# Patient Record
Sex: MALE | Race: WHITE | Employment: FULL TIME | URBAN - NONMETROPOLITAN AREA
[De-identification: names, ages, dates, MRNs, and addresses within clinical notes are randomized per-mention and may not be internally consistent; named-entity substitution may affect disease eponyms.]

---

## 2020-12-29 ENCOUNTER — HOSPITAL ENCOUNTER (OUTPATIENT)
Dept: GENERAL RADIOLOGY | Age: 60
Discharge: HOME | End: 2020-12-29
Attending: FAMILY MEDICINE
Payer: COMMERCIAL

## 2020-12-29 ENCOUNTER — OFFICE VISIT (OUTPATIENT)
Dept: FAMILY MEDICINE CLINIC | Facility: CLINIC | Age: 60
End: 2020-12-29
Payer: COMMERCIAL

## 2020-12-29 VITALS
OXYGEN SATURATION: 98 % | TEMPERATURE: 99 F | HEIGHT: 71.5 IN | SYSTOLIC BLOOD PRESSURE: 134 MMHG | BODY MASS INDEX: 27.39 KG/M2 | WEIGHT: 200 LBS | HEART RATE: 79 BPM | DIASTOLIC BLOOD PRESSURE: 82 MMHG

## 2020-12-29 DIAGNOSIS — Z23 NEED FOR VACCINATION: ICD-10-CM

## 2020-12-29 DIAGNOSIS — N39.43 BENIGN PROSTATIC HYPERPLASIA WITH POST-VOID DRIBBLING: ICD-10-CM

## 2020-12-29 DIAGNOSIS — Z00.00 LABORATORY EXAM ORDERED AS PART OF ROUTINE GENERAL MEDICAL EXAMINATION: ICD-10-CM

## 2020-12-29 DIAGNOSIS — R09.89 ABNORMAL LUNG SOUNDS: ICD-10-CM

## 2020-12-29 DIAGNOSIS — Z12.11 SCREEN FOR COLON CANCER: ICD-10-CM

## 2020-12-29 DIAGNOSIS — N40.1 BENIGN PROSTATIC HYPERPLASIA WITH POST-VOID DRIBBLING: ICD-10-CM

## 2020-12-29 DIAGNOSIS — Z00.00 PREVENTATIVE HEALTH CARE: Primary | ICD-10-CM

## 2020-12-29 DIAGNOSIS — Z13.220 SCREENING, LIPID: ICD-10-CM

## 2020-12-29 DIAGNOSIS — Z12.5 SCREENING FOR PROSTATE CANCER: ICD-10-CM

## 2020-12-29 PROCEDURE — 3075F SYST BP GE 130 - 139MM HG: CPT | Performed by: FAMILY MEDICINE

## 2020-12-29 PROCEDURE — 99386 PREV VISIT NEW AGE 40-64: CPT | Performed by: FAMILY MEDICINE

## 2020-12-29 PROCEDURE — 71046 X-RAY EXAM CHEST 2 VIEWS: CPT | Performed by: FAMILY MEDICINE

## 2020-12-29 PROCEDURE — 3008F BODY MASS INDEX DOCD: CPT | Performed by: FAMILY MEDICINE

## 2020-12-29 PROCEDURE — 3079F DIAST BP 80-89 MM HG: CPT | Performed by: FAMILY MEDICINE

## 2020-12-29 PROCEDURE — 90471 IMMUNIZATION ADMIN: CPT | Performed by: FAMILY MEDICINE

## 2020-12-29 PROCEDURE — 90715 TDAP VACCINE 7 YRS/> IM: CPT | Performed by: FAMILY MEDICINE

## 2020-12-29 RX ORDER — TAMSULOSIN HYDROCHLORIDE 0.4 MG/1
0.4 CAPSULE ORAL NIGHTLY
Qty: 30 CAPSULE | Refills: 1 | Status: SHIPPED | OUTPATIENT
Start: 2020-12-29 | End: 2021-02-02

## 2020-12-29 RX ORDER — DESONIDE 0.5 MG/G
CREAM TOPICAL
COMMUNITY

## 2020-12-29 RX ORDER — DUTASTERIDE 0.5 MG/1
0.5 CAPSULE, LIQUID FILLED ORAL DAILY
Qty: 30 CAPSULE | Refills: 2 | Status: SHIPPED | OUTPATIENT
Start: 2020-12-29 | End: 2021-02-02 | Stop reason: ALTCHOICE

## 2020-12-29 NOTE — PATIENT INSTRUCTIONS
1. Preventative health care  Age-appropriate preventative health screening exams as well as immunizations. Flu vaccine declined    2. Screen for colon cancer  Recurrent recommendations for colon cancer screening.   Patient given contact information for bot

## 2020-12-29 NOTE — H&P
Elizabeth Araiza is a 56year old male who presents for a complete physical exam.   HPI:   Pt voices concerns: x several months,slower urinary stream, dribbles at end of void    Wt Readings from Last 6 Encounters:  12/29/20 : 200 lb (90.7 kg)  12/07/12 : 202 last eye exam:1 yr @ [facility]  ENT: denies nasal congestion, PND or ST, denies snoring and reported periods of apnea, denies hearing deficits  LUNGS: denies shortness of breath with exertion, no coughing or wheezing  CARDIOVASCULAR: denies chest pain on e nerves are intact,motor and sensory are grossly intact, DTRs +2/4 UE/LE bilaterally  PSYCH: affect: slightly anxious, speech: clear and coherent, Insight: appropriate  ASSESSMENT AND PLAN:   Imani Boone is a 56year old male  [facility] health care  (p tamsulosin if a good response was achieved. 4. Need for vaccination  I reviewed age-appropriate medications. Tdap booster given.   Patient given vaccine information for Shingrix  - TETANUS, DIPHTHERIA TOXOIDS AND ACELLULAR PERTUSIS VACCINE (TDAP), >7 YE

## 2020-12-30 ENCOUNTER — LAB ENCOUNTER (OUTPATIENT)
Dept: LAB | Age: 60
End: 2020-12-30
Attending: FAMILY MEDICINE
Payer: COMMERCIAL

## 2020-12-30 DIAGNOSIS — Z13.220 SCREENING, LIPID: ICD-10-CM

## 2020-12-30 DIAGNOSIS — R97.20 ELEVATED PSA, GREATER THAN OR EQUAL TO 20 NG/ML: Primary | ICD-10-CM

## 2020-12-30 DIAGNOSIS — Z12.5 SCREENING FOR PROSTATE CANCER: ICD-10-CM

## 2020-12-30 DIAGNOSIS — Z00.00 LABORATORY EXAM ORDERED AS PART OF ROUTINE GENERAL MEDICAL EXAMINATION: ICD-10-CM

## 2020-12-30 LAB
ALBUMIN SERPL-MCNC: 3.8 G/DL (ref 3.4–5)
ALBUMIN/GLOB SERPL: 1.1 {RATIO} (ref 1–2)
ALP LIVER SERPL-CCNC: 64 U/L
ALT SERPL-CCNC: 43 U/L
ANION GAP SERPL CALC-SCNC: 4 MMOL/L (ref 0–18)
AST SERPL-CCNC: 21 U/L (ref 15–37)
BILIRUB SERPL-MCNC: 0.6 MG/DL (ref 0.1–2)
BUN BLD-MCNC: 21 MG/DL (ref 7–18)
BUN/CREAT SERPL: 19.8 (ref 10–20)
CALCIUM BLD-MCNC: 9.2 MG/DL (ref 8.5–10.1)
CHLORIDE SERPL-SCNC: 108 MMOL/L (ref 98–112)
CHOLEST SMN-MCNC: 248 MG/DL (ref ?–200)
CO2 SERPL-SCNC: 26 MMOL/L (ref 21–32)
COMPLEXED PSA SERPL-MCNC: 113 NG/ML (ref ?–4)
CREAT BLD-MCNC: 1.06 MG/DL
GLOBULIN PLAS-MCNC: 3.4 G/DL (ref 2.8–4.4)
GLUCOSE BLD-MCNC: 100 MG/DL (ref 70–99)
HDLC SERPL-MCNC: 51 MG/DL (ref 40–59)
LDLC SERPL CALC-MCNC: 179 MG/DL (ref ?–100)
M PROTEIN MFR SERPL ELPH: 7.2 G/DL (ref 6.4–8.2)
NONHDLC SERPL-MCNC: 197 MG/DL (ref ?–130)
OSMOLALITY SERPL CALC.SUM OF ELEC: 289 MOSM/KG (ref 275–295)
PATIENT FASTING Y/N/NP: YES
PATIENT FASTING Y/N/NP: YES
POTASSIUM SERPL-SCNC: 4.2 MMOL/L (ref 3.5–5.1)
SODIUM SERPL-SCNC: 138 MMOL/L (ref 136–145)
TRIGL SERPL-MCNC: 89 MG/DL (ref 30–149)
VLDLC SERPL CALC-MCNC: 18 MG/DL (ref 0–30)

## 2020-12-30 PROCEDURE — 80061 LIPID PANEL: CPT

## 2020-12-30 PROCEDURE — 36415 COLL VENOUS BLD VENIPUNCTURE: CPT

## 2020-12-30 PROCEDURE — 80053 COMPREHEN METABOLIC PANEL: CPT

## 2021-01-01 ENCOUNTER — TELEPHONE (OUTPATIENT)
Dept: SURGERY | Facility: HOSPITAL | Age: 61
End: 2021-01-01

## 2021-01-01 DIAGNOSIS — N13.8 BPH WITH OBSTRUCTION/LOWER URINARY TRACT SYMPTOMS: Primary | ICD-10-CM

## 2021-01-01 DIAGNOSIS — N40.1 BPH WITH OBSTRUCTION/LOWER URINARY TRACT SYMPTOMS: Primary | ICD-10-CM

## 2021-01-02 DIAGNOSIS — E78.00 ELEVATED CHOLESTEROL: ICD-10-CM

## 2021-01-02 DIAGNOSIS — Z00.00 PREVENTATIVE HEALTH CARE: Primary | ICD-10-CM

## 2021-01-02 DIAGNOSIS — Z13.220 SCREENING, LIPID: ICD-10-CM

## 2021-01-04 DIAGNOSIS — E78.00 ELEVATED CHOLESTEROL: ICD-10-CM

## 2021-01-04 DIAGNOSIS — Z13.220 SCREENING, LIPID: ICD-10-CM

## 2021-01-04 DIAGNOSIS — Z00.00 PREVENTATIVE HEALTH CARE: ICD-10-CM

## 2021-01-04 PROBLEM — N40.1 BPH WITH OBSTRUCTION/LOWER URINARY TRACT SYMPTOMS: Status: ACTIVE | Noted: 2021-01-04

## 2021-01-04 PROBLEM — N13.8 BPH WITH OBSTRUCTION/LOWER URINARY TRACT SYMPTOMS: Status: ACTIVE | Noted: 2021-01-04

## 2021-01-04 PROBLEM — R97.20 ELEVATED PSA: Status: ACTIVE | Noted: 2021-01-04

## 2021-01-22 PROBLEM — N13.30 HYDRONEPHROSIS OF RIGHT KIDNEY: Status: ACTIVE | Noted: 2021-01-22

## 2021-01-22 PROCEDURE — 88305 TISSUE EXAM BY PATHOLOGIST: CPT | Performed by: UROLOGY

## 2021-01-30 ENCOUNTER — NURSE ONLY (OUTPATIENT)
Dept: FAMILY MEDICINE CLINIC | Facility: CLINIC | Age: 61
End: 2021-01-30
Payer: COMMERCIAL

## 2021-01-30 DIAGNOSIS — N40.1 BPH WITH OBSTRUCTION/LOWER URINARY TRACT SYMPTOMS: ICD-10-CM

## 2021-01-30 DIAGNOSIS — R97.20 ELEVATED PSA: ICD-10-CM

## 2021-01-30 DIAGNOSIS — N13.30 HYDRONEPHROSIS OF RIGHT KIDNEY: ICD-10-CM

## 2021-01-30 DIAGNOSIS — N13.8 BPH WITH OBSTRUCTION/LOWER URINARY TRACT SYMPTOMS: ICD-10-CM

## 2021-01-30 LAB
BUN BLD-MCNC: 14 MG/DL (ref 7–18)
CREAT BLD-MCNC: 1.09 MG/DL
PSA SERPL-MCNC: 63.2 NG/ML (ref ?–4)

## 2021-01-30 PROCEDURE — 84153 ASSAY OF PSA TOTAL: CPT | Performed by: UROLOGY

## 2021-01-30 PROCEDURE — 82565 ASSAY OF CREATININE: CPT | Performed by: UROLOGY

## 2021-01-30 PROCEDURE — 84520 ASSAY OF UREA NITROGEN: CPT | Performed by: UROLOGY

## 2021-02-01 NOTE — PROGRESS NOTES
Noted.   Positive prostate biopsy 1/22/21 with Dr. Gibson Modest scan and ct urogram pending  Patient to f/u with Dr. Erick Oro.     Future Appointments  2/2/2021   4:00 PM    Hetal UNGER MD           [facility] URO    [facility]  2/5/2021   12:00 PM   DUSTIN ORTEGA IN

## 2021-04-06 ENCOUNTER — OFFICE VISIT (OUTPATIENT)
Dept: FAMILY MEDICINE CLINIC | Facility: CLINIC | Age: 61
End: 2021-04-06
Payer: COMMERCIAL

## 2021-04-06 VITALS
BODY MASS INDEX: 23.16 KG/M2 | RESPIRATION RATE: 14 BRPM | OXYGEN SATURATION: 99 % | HEART RATE: 72 BPM | WEIGHT: 171 LBS | DIASTOLIC BLOOD PRESSURE: 80 MMHG | HEIGHT: 72 IN | SYSTOLIC BLOOD PRESSURE: 110 MMHG | TEMPERATURE: 98 F

## 2021-04-06 DIAGNOSIS — C61 PROSTATE CANCER (HCC): ICD-10-CM

## 2021-04-06 DIAGNOSIS — N40.1 BPH WITH OBSTRUCTION/LOWER URINARY TRACT SYMPTOMS: ICD-10-CM

## 2021-04-06 DIAGNOSIS — Z12.11 SCREEN FOR COLON CANCER: ICD-10-CM

## 2021-04-06 DIAGNOSIS — N13.8 BPH WITH OBSTRUCTION/LOWER URINARY TRACT SYMPTOMS: ICD-10-CM

## 2021-04-06 DIAGNOSIS — Z13.0 SCREENING, ANEMIA, DEFICIENCY, IRON: ICD-10-CM

## 2021-04-06 DIAGNOSIS — E78.00 HYPERCHOLESTEROLEMIA: ICD-10-CM

## 2021-04-06 DIAGNOSIS — C61 PROSTATE CANCER (HCC): Primary | ICD-10-CM

## 2021-04-06 PROCEDURE — 99214 OFFICE O/P EST MOD 30 MIN: CPT | Performed by: FAMILY MEDICINE

## 2021-04-06 PROCEDURE — 3074F SYST BP LT 130 MM HG: CPT | Performed by: FAMILY MEDICINE

## 2021-04-06 PROCEDURE — 3079F DIAST BP 80-89 MM HG: CPT | Performed by: FAMILY MEDICINE

## 2021-04-06 PROCEDURE — 3008F BODY MASS INDEX DOCD: CPT | Performed by: FAMILY MEDICINE

## 2021-04-06 RX ORDER — BICALUTAMIDE 50 MG/1
TABLET, FILM COATED ORAL
COMMUNITY
Start: 2021-04-03 | End: 2021-07-15 | Stop reason: ALTCHOICE

## 2021-04-06 RX ORDER — TAMSULOSIN HYDROCHLORIDE 0.4 MG/1
0.4 CAPSULE ORAL NIGHTLY
Qty: 30 CAPSULE | Refills: 1 | Status: SHIPPED | OUTPATIENT
Start: 2021-04-06 | End: 2021-05-06

## 2021-04-06 NOTE — PROGRESS NOTES
Balaji Shelby. is a 56year old male. Patient presents with: Follow - Up  Prostate Cancer  Lipids    HPI:   Since last visit, patient was diagnosed with prostate cancer. He is undergoing hormonal and radiation therapy.   He has not started radiation the lesions or rashes  ENT: denies nasal congestion, pnd, sore throat, ear pain or pressure, decreased hearing  RESPIRATORY: denies shortness of breath with exertion,cough, wheezing  CARDIOVASCULAR: denies chest pain on exertion, edema  GI: denies abdominal pa coverage for Shingrix vaccine as well as a Prevnar. As he is getting his second Covid shot tonight, I would recommend delaying additional vaccines for the next 4 weeks 9 total 25 minutes were spent with the patient answering all of his questions.   I would

## 2021-04-06 NOTE — PATIENT INSTRUCTIONS
1. Prostate cancer (HCC)-1/22/21  I reviewed urology and radiation therapy recommendations. I agree with a second opinion. I also reviewed age-appropriate medications.   I have asked the patient to check with his insurance regarding coverage for Shingrix

## 2021-04-20 ENCOUNTER — LAB ENCOUNTER (OUTPATIENT)
Dept: LAB | Facility: HOSPITAL | Age: 61
End: 2021-04-20
Attending: STUDENT IN AN ORGANIZED HEALTH CARE EDUCATION/TRAINING PROGRAM
Payer: COMMERCIAL

## 2021-04-20 DIAGNOSIS — Z01.818 PRE-OP TESTING: ICD-10-CM

## 2021-04-23 PROBLEM — Z12.11 SPECIAL SCREENING FOR MALIGNANT NEOPLASMS, COLON: Status: ACTIVE | Noted: 2021-04-23

## 2021-05-27 RX ORDER — TAMSULOSIN HYDROCHLORIDE 0.4 MG/1
CAPSULE ORAL
Qty: 30 CAPSULE | Refills: 0 | Status: SHIPPED | OUTPATIENT
Start: 2021-05-27 | End: 2021-06-22

## 2021-05-27 NOTE — TELEPHONE ENCOUNTER
Last refill #30 x 1 on 4/6/2021  Last office visit pertaining to refill on 4/6/2021  No future appointments.

## 2021-06-22 RX ORDER — TAMSULOSIN HYDROCHLORIDE 0.4 MG/1
0.4 CAPSULE ORAL NIGHTLY
Qty: 90 CAPSULE | Refills: 1 | Status: SHIPPED | OUTPATIENT
Start: 2021-06-22 | End: 2021-12-20

## 2021-06-22 NOTE — TELEPHONE ENCOUNTER
Last refill; 05/27/21  Qty: 30  W/ 0 refills  Last ov: 04/06/21    Requested Prescriptions     Pending Prescriptions Disp Refills   • tamsulosin (FLOMAX) cap [Pharmacy Med Name: TAMSULOSIN HCL 0.4 MG CAPSULE] 30 capsule 0     Sig: TAKE 1 CAPSULE BY MOUTH E

## 2021-06-30 ENCOUNTER — MED REC SCAN ONLY (OUTPATIENT)
Dept: FAMILY MEDICINE CLINIC | Facility: CLINIC | Age: 61
End: 2021-06-30

## 2021-07-13 ENCOUNTER — TELEPHONE (OUTPATIENT)
Dept: FAMILY MEDICINE CLINIC | Facility: CLINIC | Age: 61
End: 2021-07-13

## 2021-07-13 NOTE — TELEPHONE ENCOUNTER
PT. COMING IN ON Thursday FOR F/U FROM ER FOR BAD CASE OF HIVES. HE WANTS TO GET HIS FASTING LABS DONE BUT HE HAS TO EAT A VERY SMALL BREAKFAST BECAUSE OF MEDICATIONS HE MUST TAKE AT A CERTAIN TIME EVERY DAY AND MUST TAKE WITH FOOD.  HE WOULD JUST LIKE TO D

## 2021-07-13 NOTE — TELEPHONE ENCOUNTER
Pt advised of below. States he is on new meds for prostate cancer---he will bring list to update chart when here for OV.

## 2021-07-15 ENCOUNTER — OFFICE VISIT (OUTPATIENT)
Dept: FAMILY MEDICINE CLINIC | Facility: CLINIC | Age: 61
End: 2021-07-15
Payer: COMMERCIAL

## 2021-07-15 ENCOUNTER — LAB ENCOUNTER (OUTPATIENT)
Dept: LAB | Age: 61
End: 2021-07-15
Attending: FAMILY MEDICINE
Payer: COMMERCIAL

## 2021-07-15 VITALS
SYSTOLIC BLOOD PRESSURE: 110 MMHG | HEART RATE: 66 BPM | OXYGEN SATURATION: 100 % | BODY MASS INDEX: 24 KG/M2 | DIASTOLIC BLOOD PRESSURE: 78 MMHG | WEIGHT: 169 LBS | RESPIRATION RATE: 12 BRPM | TEMPERATURE: 98 F

## 2021-07-15 DIAGNOSIS — E78.00 HYPERCHOLESTEROLEMIA: ICD-10-CM

## 2021-07-15 DIAGNOSIS — F19.982 DRUG-INDUCED INSOMNIA (HCC): ICD-10-CM

## 2021-07-15 DIAGNOSIS — Z13.0 SCREENING, ANEMIA, DEFICIENCY, IRON: ICD-10-CM

## 2021-07-15 DIAGNOSIS — Z79.52 CURRENT CHRONIC USE OF SYSTEMIC STEROIDS: ICD-10-CM

## 2021-07-15 DIAGNOSIS — L23.7 ALLERGIC CONTACT DERMATITIS DUE TO PLANTS, EXCEPT FOOD: Primary | ICD-10-CM

## 2021-07-15 DIAGNOSIS — C61 PROSTATE CANCER (HCC): ICD-10-CM

## 2021-07-15 PROBLEM — Z80.41 FAMILY HISTORY OF OVARIAN CANCER: Status: ACTIVE | Noted: 2021-05-04

## 2021-07-15 PROBLEM — E83.110 HEREDITARY HEMOCHROMATOSIS: Status: ACTIVE | Noted: 2021-05-27

## 2021-07-15 PROBLEM — Z13.79 GENETIC TESTING: Status: ACTIVE | Noted: 2021-04-23

## 2021-07-15 PROBLEM — Z80.3 FAMILY HISTORY OF BREAST CANCER: Status: ACTIVE | Noted: 2021-05-04

## 2021-07-15 PROBLEM — Z79.818 ANDROGEN DEPRIVATION THERAPY: Status: ACTIVE | Noted: 2021-04-21

## 2021-07-15 PROBLEM — E83.110 HEREDITARY HEMOCHROMATOSIS (HCC): Status: ACTIVE | Noted: 2021-05-27

## 2021-07-15 PROBLEM — IMO0001 ANDROGEN DEPRIVATION THERAPY: Status: ACTIVE | Noted: 2021-04-21

## 2021-07-15 PROBLEM — Z80.0 FAMILY HISTORY OF PANCREATIC CANCER: Status: ACTIVE | Noted: 2021-05-04

## 2021-07-15 LAB
BASOPHILS # BLD AUTO: 0.01 X10(3) UL (ref 0–0.2)
BASOPHILS NFR BLD AUTO: 0.3 %
CHOLEST SMN-MCNC: 182 MG/DL (ref ?–200)
DEPRECATED RDW RBC AUTO: 43.4 FL (ref 35.1–46.3)
EOSINOPHIL # BLD AUTO: 0.01 X10(3) UL (ref 0–0.7)
EOSINOPHIL NFR BLD AUTO: 0.3 %
ERYTHROCYTE [DISTWIDTH] IN BLOOD BY AUTOMATED COUNT: 12.5 % (ref 11–15)
HCT VFR BLD AUTO: 39.4 %
HDLC SERPL-MCNC: 64 MG/DL (ref 40–59)
HGB BLD-MCNC: 14.4 G/DL
IMM GRANULOCYTES # BLD AUTO: 0.01 X10(3) UL (ref 0–1)
IMM GRANULOCYTES NFR BLD: 0.3 %
LDLC SERPL CALC-MCNC: 101 MG/DL (ref ?–100)
LYMPHOCYTES # BLD AUTO: 0.56 X10(3) UL (ref 1–4)
LYMPHOCYTES NFR BLD AUTO: 18.9 %
MCH RBC QN AUTO: 35.6 PG (ref 26–34)
MCHC RBC AUTO-ENTMCNC: 36.5 G/DL (ref 31–37)
MCV RBC AUTO: 97.3 FL
MONOCYTES # BLD AUTO: 0.32 X10(3) UL (ref 0.1–1)
MONOCYTES NFR BLD AUTO: 10.8 %
NEUTROPHILS # BLD AUTO: 2.06 X10 (3) UL (ref 1.5–7.7)
NEUTROPHILS # BLD AUTO: 2.06 X10(3) UL (ref 1.5–7.7)
NEUTROPHILS NFR BLD AUTO: 69.4 %
NONHDLC SERPL-MCNC: 118 MG/DL (ref ?–130)
PATIENT FASTING Y/N/NP: YES
PLATELET # BLD AUTO: 90 10(3)UL (ref 150–450)
PSA SERPL-MCNC: 0.06 NG/ML (ref ?–4)
RBC # BLD AUTO: 4.05 X10(6)UL
TRIGL SERPL-MCNC: 97 MG/DL (ref 30–149)
VLDLC SERPL CALC-MCNC: 16 MG/DL (ref 0–30)
WBC # BLD AUTO: 3 X10(3) UL (ref 4–11)

## 2021-07-15 PROCEDURE — 3074F SYST BP LT 130 MM HG: CPT | Performed by: FAMILY MEDICINE

## 2021-07-15 PROCEDURE — 85025 COMPLETE CBC W/AUTO DIFF WBC: CPT

## 2021-07-15 PROCEDURE — 99213 OFFICE O/P EST LOW 20 MIN: CPT | Performed by: FAMILY MEDICINE

## 2021-07-15 PROCEDURE — 3078F DIAST BP <80 MM HG: CPT | Performed by: FAMILY MEDICINE

## 2021-07-15 RX ORDER — EPINEPHRINE 0.3 MG/.3ML
INJECTION SUBCUTANEOUS
COMMUNITY
Start: 2021-07-12

## 2021-07-15 RX ORDER — PREDNISONE 20 MG/1
60 TABLET ORAL DAILY
COMMUNITY
Start: 2021-07-11 | End: 2021-07-15 | Stop reason: ALTCHOICE

## 2021-07-15 RX ORDER — PREDNISONE 1 MG/1
5 TABLET ORAL DAILY
COMMUNITY
Start: 2021-06-23

## 2021-07-15 RX ORDER — ABIRATERONE ACETATE 250 MG/1
250 TABLET ORAL DAILY
COMMUNITY
Start: 2021-04-26

## 2021-07-15 NOTE — PROGRESS NOTES
Chandler Grant. is a 59year old male. Patient presents with:  ER F[facility]. Lab: Would like to discuss lab results.     Pt wants labs done  HPI:   Patient seen evaluated emergency room on 7/11 after developing diffu denies fatigue, appetite ok, wt down 31# since Dec intentionally, eating healthier, no recent exercise since starting radiation  SKIN: See HPI, patient provides photos of diffuse blanchable erythematous rash on trunk upper and lower extremities and face th sedated in the morning  Patient congratulated on making dietary changes to affect weight loss  We will check fasting lipids today  The patient indicates understanding of these issues and agrees to the plan. Corinne Martinez.  Refugio Amador, FAAFP

## 2021-07-15 NOTE — PATIENT INSTRUCTIONS
I reviewed hospital records. Discussed irritant avoidance strategies  Recommend washing off any plant oils with dish soap ASAP after exposure  Discussed management strategies for insomnia.   Would recommend use of melatonin up to 10 mg nightly as needed, p

## 2021-07-22 ENCOUNTER — OFFICE VISIT (OUTPATIENT)
Dept: FAMILY MEDICINE CLINIC | Facility: CLINIC | Age: 61
End: 2021-07-22
Payer: COMMERCIAL

## 2021-07-22 VITALS
HEART RATE: 76 BPM | TEMPERATURE: 98 F | DIASTOLIC BLOOD PRESSURE: 70 MMHG | SYSTOLIC BLOOD PRESSURE: 110 MMHG | RESPIRATION RATE: 16 BRPM | BODY MASS INDEX: 23 KG/M2 | WEIGHT: 167.81 LBS

## 2021-07-22 DIAGNOSIS — T14.8XXA PUNCTURE WOUND: ICD-10-CM

## 2021-07-22 DIAGNOSIS — M70.41 PREPATELLAR BURSITIS OF RIGHT KNEE: Primary | ICD-10-CM

## 2021-07-22 PROCEDURE — 3074F SYST BP LT 130 MM HG: CPT | Performed by: FAMILY MEDICINE

## 2021-07-22 PROCEDURE — 99213 OFFICE O/P EST LOW 20 MIN: CPT | Performed by: FAMILY MEDICINE

## 2021-07-22 PROCEDURE — 3078F DIAST BP <80 MM HG: CPT | Performed by: FAMILY MEDICINE

## 2021-07-22 RX ORDER — CEPHALEXIN 500 MG/1
500 CAPSULE ORAL 3 TIMES DAILY
Qty: 21 CAPSULE | Refills: 0 | Status: SHIPPED | OUTPATIENT
Start: 2021-07-22 | End: 2021-07-29

## 2021-07-22 NOTE — PATIENT INSTRUCTIONS
Understanding Prepatellar Bursitis     A bursa is a thin, slippery, sac-like film. It contains a small amount of fluid. This structure is found between bones and soft tissues in and around joints. A bursa cushions and protects a joint.  It keeps parts of include exercises, ultrasound, or other treatments. · Kneepads. These help protect your knees during sports. · Injection of medicine into the bursa or drainage of fluid from the bursa. These may help relieve symptoms.  The medicine is usually a corticoste

## 2021-09-21 ENCOUNTER — MED REC SCAN ONLY (OUTPATIENT)
Dept: FAMILY MEDICINE CLINIC | Facility: CLINIC | Age: 61
End: 2021-09-21

## 2021-10-03 ENCOUNTER — HOSPITAL ENCOUNTER (OUTPATIENT)
Age: 61
Discharge: HOME | End: 2021-10-03
Payer: COMMERCIAL

## 2021-10-03 VITALS
OXYGEN SATURATION: 100 % | SYSTOLIC BLOOD PRESSURE: 123 MMHG | RESPIRATION RATE: 16 BRPM | DIASTOLIC BLOOD PRESSURE: 80 MMHG | HEART RATE: 70 BPM | TEMPERATURE: 98 F

## 2021-10-03 DIAGNOSIS — B02.9 HERPES ZOSTER WITHOUT COMPLICATION: Primary | ICD-10-CM

## 2021-10-03 PROCEDURE — 99203 OFFICE O/P NEW LOW 30 MIN: CPT | Performed by: PHYSICIAN ASSISTANT

## 2021-10-03 RX ORDER — PHENOL 1.4 %
600 AEROSOL, SPRAY (ML) MUCOUS MEMBRANE
COMMUNITY

## 2021-10-03 RX ORDER — VALACYCLOVIR HYDROCHLORIDE 1 G/1
1 TABLET, FILM COATED ORAL 3 TIMES DAILY
Qty: 21 TABLET | Refills: 0 | Status: SHIPPED | OUTPATIENT
Start: 2021-10-03 | End: 2021-10-10

## 2021-10-03 RX ORDER — HYDROCODONE BITARTRATE AND ACETAMINOPHEN 5; 325 MG/1; MG/1
1 TABLET ORAL EVERY 6 HOURS PRN
Qty: 10 TABLET | Refills: 0 | Status: SHIPPED | OUTPATIENT
Start: 2021-10-03 | End: 2021-10-10

## 2021-10-03 RX ORDER — CHOLECALCIFEROL (VITAMIN D3) 50 MCG
TABLET ORAL
COMMUNITY

## 2021-10-03 NOTE — ED INITIAL ASSESSMENT (HPI)
Pt sts left mid/upper abd tender/sensitive to the touch. This morning noted rash to the area. Denies fever.

## 2021-10-03 NOTE — ED PROVIDER NOTES
Patient Seen in: [facility] [address] [place] [address]      History   Patient presents with:  Rash Skin Problem    Stated Complaint: Rash x 1 day    Subjective:   HPI    22-year-old male presents to the IC for evaluation of rash to abdomen for 1 day.   Patient states he Refill: Capillary refill takes less than 2 seconds. Findings: Rash (3 vesicles to left upper abd) present. Neurological:      Mental Status: He is alert.              ED Course   Labs Reviewed - No data to display                MDM      59year-old

## 2021-12-10 ENCOUNTER — OFFICE VISIT (OUTPATIENT)
Dept: FAMILY MEDICINE CLINIC | Facility: CLINIC | Age: 61
End: 2021-12-10
Payer: COMMERCIAL

## 2021-12-10 ENCOUNTER — TELEPHONE (OUTPATIENT)
Dept: FAMILY MEDICINE CLINIC | Facility: CLINIC | Age: 61
End: 2021-12-10

## 2021-12-10 VITALS
HEIGHT: 72 IN | SYSTOLIC BLOOD PRESSURE: 120 MMHG | TEMPERATURE: 98 F | DIASTOLIC BLOOD PRESSURE: 83 MMHG | OXYGEN SATURATION: 98 % | WEIGHT: 175 LBS | BODY MASS INDEX: 23.7 KG/M2

## 2021-12-10 DIAGNOSIS — R09.89 RUNNY NOSE: Primary | ICD-10-CM

## 2021-12-10 DIAGNOSIS — Z20.822 EXPOSURE TO COVID-19 VIRUS: ICD-10-CM

## 2021-12-10 PROCEDURE — 3079F DIAST BP 80-89 MM HG: CPT | Performed by: PHYSICIAN ASSISTANT

## 2021-12-10 PROCEDURE — 99213 OFFICE O/P EST LOW 20 MIN: CPT | Performed by: PHYSICIAN ASSISTANT

## 2021-12-10 PROCEDURE — 3008F BODY MASS INDEX DOCD: CPT | Performed by: PHYSICIAN ASSISTANT

## 2021-12-10 PROCEDURE — U0002 COVID-19 LAB TEST NON-CDC: HCPCS | Performed by: PHYSICIAN ASSISTANT

## 2021-12-10 PROCEDURE — 3074F SYST BP LT 130 MM HG: CPT | Performed by: PHYSICIAN ASSISTANT

## 2021-12-10 NOTE — TELEPHONE ENCOUNTER
PT. HAVING SOME SYMPTOMS AND HE IS ASKING ABOUT THE OTC COVID TEST FROM [facility]. IS IT RELIABLE? 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

## 2021-12-10 NOTE — TELEPHONE ENCOUNTER
Spoke with patient who states he has 3 coworkers who are positive for Alondra. Patient states he has started with a bronchial intermittent cough and a slight runny nose. Denies any further symptoms.  He states he is going to try a home test. Advised he can do

## 2021-12-10 NOTE — PROGRESS NOTES
CHIEF COMPLAINT:   Patient presents with:  Runny Nose    HPI:   Jo Pruitt is a 59year old male who presents to [facility] for COVID-19 testing with symptoms/history as described below:  Onset: 1.5 weeks  Exposure to COVID:   Exposure to covid at work [employer] INJECT 0.3 MLS INTO THE MUSCLE ONCE FOR 1 DOSE. • Leuprolide Acetate, 6 Month, 45 MG Subcutaneous Kit Inject 45 mg into the skin every 6 (six) months.         Past Medical History:   Diagnosis Date   • Esophageal reflux    • Other and unspecified hyperl Collection Time: 12/10/21  5:40 PM    Specimen: Nares   Result Value Ref Range    Rapid SARS-CoV-2 by PCR Not Detected Not Detected    POCT Lot Number [id number]     POCT Expiration Date 6/8/22     POCT Procedure Control Control Valid Control Valid    Op

## 2021-12-20 RX ORDER — TAMSULOSIN HYDROCHLORIDE 0.4 MG/1
0.4 CAPSULE ORAL NIGHTLY
Qty: 90 CAPSULE | Refills: 3 | Status: SHIPPED | OUTPATIENT
Start: 2021-12-20

## 2022-05-17 ENCOUNTER — TELEPHONE (OUTPATIENT)
Dept: FAMILY MEDICINE CLINIC | Facility: CLINIC | Age: 62
End: 2022-05-17

## 2022-05-17 NOTE — TELEPHONE ENCOUNTER
L/m for pt to c/b to get more info. Last OV here was on 7/22/21  No future appts scheduled    Who is oncologist?  How often does he get these done?     Will need to check with Dr. Char Siemens and nurse manager

## 2022-05-17 NOTE — TELEPHONE ENCOUNTER
Angela Fu is calling he said that he gets Vit B 12 shots with his Oncologist and he is wondering if that would be something that he would be able to get done here please call

## 2023-10-12 ENCOUNTER — OFFICE VISIT (OUTPATIENT)
Dept: FAMILY MEDICINE CLINIC | Facility: CLINIC | Age: 63
End: 2023-10-12
Payer: MEDICARE

## 2023-10-12 VITALS
TEMPERATURE: 98 F | HEART RATE: 82 BPM | DIASTOLIC BLOOD PRESSURE: 80 MMHG | SYSTOLIC BLOOD PRESSURE: 120 MMHG | WEIGHT: 203.25 LBS | OXYGEN SATURATION: 99 % | HEIGHT: 71.75 IN | BODY MASS INDEX: 27.83 KG/M2

## 2023-10-12 DIAGNOSIS — T50.905A DRUG-INDUCED GYNECOMASTIA: ICD-10-CM

## 2023-10-12 DIAGNOSIS — Z00.00 ENCOUNTER FOR ANNUAL HEALTH EXAMINATION: Primary | ICD-10-CM

## 2023-10-12 DIAGNOSIS — Z11.59 NEED FOR HEPATITIS C SCREENING TEST: ICD-10-CM

## 2023-10-12 DIAGNOSIS — M54.50 CHRONIC MIDLINE LOW BACK PAIN WITHOUT SCIATICA: ICD-10-CM

## 2023-10-12 DIAGNOSIS — D69.6 THROMBOCYTOPENIA (HCC): ICD-10-CM

## 2023-10-12 DIAGNOSIS — D12.6 TUBULAR ADENOMA OF COLON: ICD-10-CM

## 2023-10-12 DIAGNOSIS — G89.29 CHRONIC MIDLINE LOW BACK PAIN WITHOUT SCIATICA: ICD-10-CM

## 2023-10-12 DIAGNOSIS — L40.9 PSORIASIS: ICD-10-CM

## 2023-10-12 DIAGNOSIS — E83.110 HEREDITARY HEMOCHROMATOSIS (HCC): ICD-10-CM

## 2023-10-12 DIAGNOSIS — C61 MALIGNANT NEOPLASM OF PROSTATE (HCC): ICD-10-CM

## 2023-10-12 DIAGNOSIS — R74.8 ELEVATED LIVER ENZYMES: ICD-10-CM

## 2023-10-12 DIAGNOSIS — Z13.1 SCREENING FOR DIABETES MELLITUS: ICD-10-CM

## 2023-10-12 DIAGNOSIS — K76.0 HEPATIC STEATOSIS: ICD-10-CM

## 2023-10-12 DIAGNOSIS — E83.10 DISORDER OF IRON METABOLISM, UNSPECIFIED: ICD-10-CM

## 2023-10-12 DIAGNOSIS — N62 DRUG-INDUCED GYNECOMASTIA: ICD-10-CM

## 2023-10-12 DIAGNOSIS — Z13.220 SCREENING, LIPID: ICD-10-CM

## 2023-10-12 DIAGNOSIS — Z23 NEED FOR VACCINATION: ICD-10-CM

## 2023-10-12 DIAGNOSIS — Z80.0 FAMILY HISTORY OF PANCREATIC CANCER: ICD-10-CM

## 2023-10-12 DIAGNOSIS — Z80.41 FAMILY HISTORY OF OVARIAN CANCER: ICD-10-CM

## 2023-10-12 LAB — PSA: 0.24

## 2023-10-12 PROCEDURE — 90677 PCV20 VACCINE IM: CPT | Performed by: FAMILY MEDICINE

## 2023-10-12 PROCEDURE — G0402 INITIAL PREVENTIVE EXAM: HCPCS | Performed by: FAMILY MEDICINE

## 2023-10-12 PROCEDURE — G0008 ADMIN INFLUENZA VIRUS VAC: HCPCS | Performed by: FAMILY MEDICINE

## 2023-10-12 PROCEDURE — G0009 ADMIN PNEUMOCOCCAL VACCINE: HCPCS | Performed by: FAMILY MEDICINE

## 2023-10-12 PROCEDURE — 90662 IIV NO PRSV INCREASED AG IM: CPT | Performed by: FAMILY MEDICINE

## 2023-10-12 PROCEDURE — 99214 OFFICE O/P EST MOD 30 MIN: CPT | Performed by: FAMILY MEDICINE

## 2023-10-12 NOTE — PATIENT INSTRUCTIONS
Lino Gould Jr.'s SCREENING SCHEDULE   Tests on this list are recommended by your physician but may not be covered, or covered at this frequency, by your insurer. Please check with your insurance carrier before scheduling to verify coverage.    PREVENTATIVE SERVICES FREQUENCY &  COVERAGE DETAILS LAST COMPLETION DATE   Diabetes Screening    Fasting Blood Sugar / Glucose    One screening every 12 months if never tested or if previously tested but not diagnosed with pre-diabetes   One screening every 6 months if diagnosed with pre-diabetes Lab Results   Component Value Date     (H) 12/30/2020        Cardiovascular Disease Screening    Lipid Panel  Cholesterol  Lipoprotein (HDL)  Triglycerides Covered every 5 years for all Medicare beneficiaries without apparent signs or symptoms of cardiovascular disease Lab Results   Component Value Date    CHOLEST 182 07/15/2021    HDL 64 (H) 07/15/2021     (H) 07/15/2021    TRIG 97 07/15/2021         Electrocardiogram (EKG)   Covered if needed at Welcome to Medicare, and non-screening if indicated for medical reasons -      Ultrasound Screening for Abdominal Aortic Aneurysm (AAA) Covered once in a lifetime for one of the following risk factors    Men who are 73-68 years old and have ever smoked    Anyone with a family history -     Colorectal Cancer Screening  Covered for ages 52-80; only need ONE of the following:    Colonoscopy   Covered every 10 years    Covered every 2 years if patient is at high risk or previous colonoscopy was abnormal 04/23/2021    Colorectal Cancer Screening due on 04/23/2028    Flexible Sigmoidoscopy   Covered every 4 years -    Fecal Occult Blood Test Covered annually -   Prostate Cancer Screening    Prostate-Specific Antigen (PSA) Annually Lab Results   Component Value Date    PSA 0.06 07/15/2021     PSA due on 07/15/2023   Immunizations    Influenza Covered once per flu season  Please get every year -  Influenza Vaccine(1) due on 10/01/2023    Pneumococcal Each vaccine (Gxzlaww52 & Hoiariemb83) covered once after 72 Prevnar 13: -    [id number]: -     Pneumococcal Vaccination(1 - PCV) Never done    Hepatitis B One screening covered for patients with certain risk factors   -  No recommendations at this time    Tetanus Toxoid Not covered by Medicare Part B unless medically necessary (cut with metal); may be covered with your pharmacy prescription benefits -    Tetanus, Diptheria and Pertusis TD and TDaP Not covered by Medicare Part B -  No recommendations at this time    Zoster Not covered by Medicare Part B; may be covered with your pharmacy  prescription benefits -  Zoster Vaccines(2 of 2) due on 2022      1. Encounter for annual health examination  I reviewed age-appropriate preventative screening exams as well as advanced directives and immunizations. 30 minutes was spent prior to the appointment reviewing consultations, specialist notes, imaging, labs etc.    2. Need for hepatitis C screening test  Reviewed current recommendations for hepatitis C screening  - HCV AB for  1945 thru 1965 (Once in a lifetime); Future    3. Malignant neoplasm of prostate ([address]  Continue watchful observation for recurrence of prostate cancer. Follow-up with urologist and oncologist as scheduled, repeat PSA in 3 months    4. Psoriasis  Continue topical therapy, follow-up with dermatologist as needed    5. Hepatic steatosis- US 23  Reviewed results of abdominal ultrasound. Recommend 10 to 15 pound weight loss    6. Elevated liver enzymes  Monitor clinically. Discussed importance of avoiding excessive alcohol, acetaminophen and focus on weight loss    7. Thrombocytopenia ([address]- PLTS 140K on 23  Recommend serial CBCs    8. Drug-induced gynecomastia- mammogram 23  Discussed diagnosis, monitor clinically    9.  Hereditary hemochromatosis ([address]  Given elevated liver enzyme and elevated iron stores, patient referred to Dr. Tia Huang for hepatology opinion  - Hepatology Referral - Contracted Specialist - Hilda  - Ferritin [E]  - Iron And Tibc [E]    10. Family history of ovarian cancer  Noted    11. Family history of pancreatic cancer  Noted    12. Tubular adenoma of colon- 4/23/21, repeat 7 yrs  Repeat colonoscopy 2028    13. Need for vaccination  High-dose flu and PCV 20 given, will check [facility] for dates of second Shingrix  - PCV20 (Prevnar 20)  - FLU VACC HIGH DOSE PRSV FREE    14. Screening for diabetes mellitus  Check A1c at patient's request  - Hemoglobin A1C [E]    15. Screening, lipid  Reviewed goals for lipids  - Lipid Panel [E]    16. Disorder of iron metabolism, unspecified  Check iron stores  - Hemoglobin A1C [E]    17. Chronic midline low back pain without sciatica  Discussed spinal, hip, hamstring, piriformis stretches.   Reviewed home exercise regimen, activity as tolerated

## 2023-10-13 ENCOUNTER — TELEPHONE (OUTPATIENT)
Dept: FAMILY MEDICINE CLINIC | Facility: CLINIC | Age: 63
End: 2023-10-13

## 2023-10-13 NOTE — TELEPHONE ENCOUNTER
Pt was in yesterday Dr ordered labs for [facility], but one was for [facility] wanting to know if it can also be sent to [id number].

## 2023-10-13 NOTE — TELEPHONE ENCOUNTER
All labs faxed to [address] [place] [address] in [place]. Detailed message left with patient with this information.

## 2023-10-18 LAB
% SATURATION: 50 % (CALC) (ref 20–48)
CHOL/HDLC RATIO: 5.8 (CALC)
CHOLESTEROL, TOTAL: 273 MG/DL
FERRITIN: 495 NG/ML (ref 24–380)
HDL CHOLESTEROL: 47 MG/DL
HEMOGLOBIN A1C: 5.3 % OF TOTAL HGB
IRON BINDING CAPACITY: 273 MCG/DL (CALC) (ref 250–425)
IRON, TOTAL: 136 MCG/DL (ref 50–180)
LDL-CHOLESTEROL: 184 MG/DL (CALC)
NON-HDL CHOLESTEROL: 226 MG/DL (CALC)
TRIGLYCERIDES: 227 MG/DL

## 2023-10-19 ENCOUNTER — TELEPHONE (OUTPATIENT)
Dept: FAMILY MEDICINE CLINIC | Facility: CLINIC | Age: 63
End: 2023-10-19

## 2024-02-28 ENCOUNTER — MED REC SCAN ONLY (OUTPATIENT)
Dept: FAMILY MEDICINE CLINIC | Facility: CLINIC | Age: 64
End: 2024-02-28

## 2024-08-06 ENCOUNTER — PATIENT OUTREACH (OUTPATIENT)
Dept: FAMILY MEDICINE CLINIC | Facility: CLINIC | Age: 64
End: 2024-08-06

## 2024-08-15 ENCOUNTER — OFFICE VISIT (OUTPATIENT)
Dept: FAMILY MEDICINE CLINIC | Facility: CLINIC | Age: 64
End: 2024-08-15
Payer: MEDICARE

## 2024-08-15 VITALS
WEIGHT: 199.5 LBS | DIASTOLIC BLOOD PRESSURE: 80 MMHG | SYSTOLIC BLOOD PRESSURE: 120 MMHG | BODY MASS INDEX: 27 KG/M2 | HEART RATE: 70 BPM | RESPIRATION RATE: 16 BRPM | OXYGEN SATURATION: 99 % | TEMPERATURE: 98 F

## 2024-08-15 DIAGNOSIS — M85.88 OSTEOPENIA OF LUMBAR SPINE: ICD-10-CM

## 2024-08-15 DIAGNOSIS — S39.012A LUMBOSACRAL STRAIN, INITIAL ENCOUNTER: Primary | ICD-10-CM

## 2024-08-15 DIAGNOSIS — Z85.46 HISTORY OF PROSTATE CANCER: ICD-10-CM

## 2024-08-15 PROCEDURE — 99214 OFFICE O/P EST MOD 30 MIN: CPT | Performed by: FAMILY MEDICINE

## 2024-08-15 RX ORDER — METHYLPREDNISOLONE 4 MG/1
TABLET ORAL
Qty: 1 EACH | Refills: 0 | Status: SHIPPED | OUTPATIENT
Start: 2024-08-15

## 2024-08-15 RX ORDER — CYCLOBENZAPRINE HCL 10 MG
10 TABLET ORAL NIGHTLY
Qty: 7 TABLET | Refills: 0 | Status: SHIPPED | OUTPATIENT
Start: 2024-08-15

## 2024-08-15 NOTE — PROGRESS NOTES
James Gould  is a 64 year old male.  Chief Complaint   Patient presents with    Low Back Pain     LBP RT side- into RT leg       HPI:   C/o LBP x 2 months,hurts to get out of a chair, doing some work on a ladder, twisting, did some heavy lifting, right sided pain, no significant radiation  Both feet feel numb \"a little:, tried ice/heat , corset, a little NSAIDS  MRI of the lumbar spine 8/23/2022  FINDINGS:   There are 5 nonrib-bearing lumbar type vertebral bodies with the last well-formed disc space labeled L5-S1.   There is maintenance of the normal lumbar spine lordosis.   Acute to subacute bilateral S1 and S2 segments sacral insufficiency fractures. Postradiation changes with fatty marrow replacement of the L4 and L5 vertebral bodies and sacrum. No lumbar vertebral body height loss.   The conus medullaris terminates at the lower L1 level and is normal in signal.   No epidural collections are appreciated.   The visualized paraspinal soft tissues are normal.   Multilevel degenerative lumbar spondylosis with degenerative disc disease, facet arthrosis, and ligamentum flavum hypertrophy as detailed below:   At T12-L1: No disc herniation. No central spinal canal or foraminal stenosis.   At L1-L2: Annular fissure and bulging disc along with mild facet arthrosis and ligamentum flavum hypertrophy. No central spinal canal or foraminal stenosis.   At L2-L3: Annular fissure and bulging disc along with facet arthrosis, left facet joint effusion, and ligamentum flavum hypertrophy. Mild bilateral foraminal stenosis.   At L3-L4: Bulging disc along with facet arthrosis, facet joint effusion, and ligamentum flavum hypertrophy. Moderate right and mild left foraminal stenosis.   At L4-L5: Right asymmetric bulging disc along with facet arthrosis, left facet joint effusion, and ligamentum flavum hypertrophy. Moderate bilateral foraminal stenosis.   At L5-S1: Bulging disc along with facet arthrosis. No central spinal canal or  foraminal stenosis.   No lumbar spine metastatic disease.     IMPRESSION:   1. Acute to subacute bilateral S1 and S2 segments sacral insufficiency fractures.   2.  Postradiation changes with fatty marrow replacement of the L4 and L5 vertebral bodies and sacrum. No lumbar vertebral body height loss.   3.  No lumbar spine metastatic disease.     Pt concerned about osteoporosis  DEXA 9/1/22, osteopenia  Current Outpatient Medications   Medication Sig Dispense Refill    Calcium Carbonate 600 MG Oral Tab Take 1 tablet (600 mg total) by mouth.      Cholecalciferol (VITAMIN D) 50 MCG (2000 UT) Oral Tab Take by mouth.      Cyanocobalamin (VITAMIN B 12 OR) Inject 1,000 mcg into the muscle every 30 (thirty) days.      EPINEPHrine 0.3 MG/0.3ML Injection Solution Auto-injector INJECT 0.3 MLS INTO THE MUSCLE ONCE FOR 1 DOSE.      Desonide 0.05 % External Cream Apply topically. Pt uses couple times a month for psoriasis        Past Medical History:    Esophageal reflux    Other and unspecified hyperlipidemia    Prostate cancer (HCC)    urology: Dr Lowe/ Nig[place], RT- Dr Colt Murguia, anti-hormone therapy    Psoriasis      Social History:  Social History     Socioeconomic History    Marital status:    Tobacco Use    Smoking status: Former    Smokeless tobacco: Never    Tobacco comments:     quit when pt was 18   Vaping Use    Vaping status: Never Used   Substance and Sexual Activity    Alcohol use: Not Currently    Drug use: No   Other Topics Concern    Caffeine Concern No    Exercise Yes    Seat Belt Yes    Special Diet No    Stress Concern No    Weight Concern No        REVIEW OF SYSTEMS:   GENERAL HEALTH: feels well otherwise, denies fatigue, appetite ok  SKIN: denies any unusual skin lesions or rashes  ENT: denies nasal congestion, pnd, sore throat, ear pain or pressure, decreased hearing  RESPIRATORY: denies shortness of breath with exertion,cough, wheezing  CARDIOVASCULAR: denies chest pain on exertion, edema  GI: denies  abdominal pain and denies heartburn  : No loss of continence, no decreased sensation in the perineum  NEURO: denies headaches  PSYCH: denies feeling stressed or anxious  MSK: see hpi  EXAM:   /80   Pulse 70   Temp 98.1 °F (36.7 °C) (Tympanic)   Resp 16   Wt 199 lb 8 oz (90.5 kg)   SpO2 99%   BMI 27.25 kg/m²   GENERAL: well developed, well nourished,in no apparent distress, well hydrated  SKIN: no rashes,no suspicious lesions  ENT: TMs: intact, good mobility, Nose: turbinates clear, no dc, Throat: no erythema, pnd, or lesions  NECK: supple,no adenopathy,no bruits, no thyromegaly  LUNGS: clear to auscultation, no w/r/r  CARDIO: RRR without murmur, peripheral pulses intact  GI: good BS's,no masses, HSM or tenderness  MSK: Tight hamstrings right greater than left, lumbar flexion fingers to thighs, no pain with extension past neutral spine or with lateral flexion.  Discomfort to palpation right SI joint, tight piriformis right greater than left  Neuro: Alert and oriented x 3, cranial nerves II through XII intact, DTRs are symmetrical upper and lower extremities bilaterally, no gross motor or sensory deficits noted, monofilament testing intact in both feet    ASSESSMENT AND PLAN:     Encounter Diagnoses   Name Primary?    Lumbosacral strain, initial encounter Yes    Osteopenia of lumbar spine     History of prostate cancer      No orders of the defined types were placed in this encounter.    Meds & Refills for this Visit:  Requested Prescriptions     Signed Prescriptions Disp Refills    cyclobenzaprine 10 MG Oral Tab 7 tablet 0     Sig: Take 1 tablet (10 mg total) by mouth nightly.    methylPREDNISolone (MEDROL) 4 MG Oral Tablet Therapy Pack 1 each 0     Sig: As directed.     Imaging & Consults:  OP REFERRAL TO [facility]  XR DEXA BONE DENSITOMETRY (CPT=77080)  No follow-ups on file.  Patient Instructions   Recommend moist heat no greater than 15 to 20 minutes followed by stretching,  patient instructed in proper spinal, hip, hamstring, piriformis stretches  Patient referred for physical therapy  May use cyclobenzaprine 10 mg nightly  Start Medrol Dosepak  I reviewed prior imaging and bone density results.  Would recommend follow-up bone density testing  Activity as tolerated, may use ibuprofen or acetaminophen as needed for pain   The patient indicates understanding of these issues and agrees to the plan.    Kelvin Kauffman DO, FAAFP

## 2024-08-15 NOTE — PATIENT INSTRUCTIONS
Recommend moist heat no greater than 15 to 20 minutes followed by stretching, patient instructed in proper spinal, hip, hamstring, piriformis stretches  Patient referred for physical therapy  May use cyclobenzaprine 10 mg nightly  Start Medrol Dosepak  I reviewed prior imaging and bone density results.  Would recommend follow-up bone density testing  Activity as tolerated, may use ibuprofen or acetaminophen as needed for pain

## 2024-08-16 ENCOUNTER — TELEPHONE (OUTPATIENT)
Dept: FAMILY MEDICINE CLINIC | Facility: CLINIC | Age: 64
End: 2024-08-16

## 2024-08-16 NOTE — TELEPHONE ENCOUNTER
Patient calling to discuss an order that was placed for Dexa Scan, he stated he is not seeing [facility] ([facility]) as an option like what was discussed.

## 2024-08-16 NOTE — TELEPHONE ENCOUNTER
Calling patient - order faxed to [facility] - central scheduling number given to patient. No further actions needed.

## 2024-08-26 ENCOUNTER — TELEPHONE (OUTPATIENT)
Dept: FAMILY MEDICINE CLINIC | Facility: CLINIC | Age: 64
End: 2024-08-26

## 2024-08-26 ENCOUNTER — MED REC SCAN ONLY (OUTPATIENT)
Dept: FAMILY MEDICINE CLINIC | Facility: CLINIC | Age: 64
End: 2024-08-26

## 2024-08-26 NOTE — TELEPHONE ENCOUNTER
Please advise patient that I reviewed his bone density test.  There is mild osteopenia.  No indication for medication    Kelvin Kauffman, DO

## 2024-08-27 ENCOUNTER — TELEPHONE (OUTPATIENT)
Dept: PHYSICAL THERAPY | Facility: HOSPITAL | Age: 64
End: 2024-08-27

## 2024-10-09 ENCOUNTER — ORDER TRANSCRIPTION (OUTPATIENT)
Dept: ADMINISTRATIVE | Facility: HOSPITAL | Age: 64
End: 2024-10-09

## 2024-10-09 ENCOUNTER — OFFICE VISIT (OUTPATIENT)
Dept: FAMILY MEDICINE CLINIC | Facility: CLINIC | Age: 64
End: 2024-10-09
Payer: MEDICARE

## 2024-10-09 VITALS
OXYGEN SATURATION: 99 % | SYSTOLIC BLOOD PRESSURE: 122 MMHG | TEMPERATURE: 97 F | BODY MASS INDEX: 26.57 KG/M2 | HEIGHT: 72 IN | RESPIRATION RATE: 18 BRPM | HEART RATE: 74 BPM | WEIGHT: 196.19 LBS | DIASTOLIC BLOOD PRESSURE: 82 MMHG

## 2024-10-09 DIAGNOSIS — M54.50 CHRONIC MIDLINE LOW BACK PAIN WITHOUT SCIATICA: ICD-10-CM

## 2024-10-09 DIAGNOSIS — E78.00 HYPERCHOLESTEROLEMIA: ICD-10-CM

## 2024-10-09 DIAGNOSIS — Z85.46 HISTORY OF PROSTATE CANCER: ICD-10-CM

## 2024-10-09 DIAGNOSIS — Z80.3 FAMILY HISTORY OF BREAST CANCER: ICD-10-CM

## 2024-10-09 DIAGNOSIS — Z13.6 SCREENING FOR HEART DISEASE: Primary | ICD-10-CM

## 2024-10-09 DIAGNOSIS — R79.89 ELEVATED FERRITIN LEVEL: ICD-10-CM

## 2024-10-09 DIAGNOSIS — L40.9 PSORIASIS: ICD-10-CM

## 2024-10-09 DIAGNOSIS — D69.6 THROMBOCYTOPENIA (HCC): ICD-10-CM

## 2024-10-09 DIAGNOSIS — G89.29 CHRONIC MIDLINE LOW BACK PAIN WITHOUT SCIATICA: ICD-10-CM

## 2024-10-09 DIAGNOSIS — Z80.41 FAMILY HISTORY OF OVARIAN CANCER: ICD-10-CM

## 2024-10-09 DIAGNOSIS — Z00.00 ENCOUNTER FOR ANNUAL HEALTH EXAMINATION: Primary | ICD-10-CM

## 2024-10-09 DIAGNOSIS — Z23 NEED FOR VACCINATION: ICD-10-CM

## 2024-10-09 DIAGNOSIS — M85.852 OSTEOPENIA OF NECK OF LEFT FEMUR: ICD-10-CM

## 2024-10-09 DIAGNOSIS — D12.6 TUBULAR ADENOMA OF COLON: ICD-10-CM

## 2024-10-09 DIAGNOSIS — R76.8 POSITIVE ANA (ANTINUCLEAR ANTIBODY): ICD-10-CM

## 2024-10-09 DIAGNOSIS — Z80.0 FAMILY HISTORY OF PANCREATIC CANCER: ICD-10-CM

## 2024-10-09 DIAGNOSIS — Z13.6 SCREENING FOR HEART DISEASE: ICD-10-CM

## 2024-10-09 DIAGNOSIS — E55.9 VITAMIN D DEFICIENCY: ICD-10-CM

## 2024-10-09 DIAGNOSIS — K76.0 HEPATIC STEATOSIS: ICD-10-CM

## 2024-10-09 DIAGNOSIS — E83.110 HEREDITARY HEMOCHROMATOSIS (HCC): ICD-10-CM

## 2024-10-09 PROBLEM — C61 MALIGNANT NEOPLASM OF PROSTATE (HCC): Status: RESOLVED | Noted: 2021-04-06 | Resolved: 2024-10-09

## 2024-10-09 NOTE — H&P
James Gould  is a 64 year old male   Chief Complaint   Patient presents with    Physical     Medicare AWV    Lipids    Prostate Cancer    Back Pain    Psoriasis     HPI:   Pt voices concerns of comprehensive lab panel from outside lab, elevated cholesterol but better than last yr, elevated JACKY 1:320, elevated ferritin but lower than last check.  WBC 2.9 K, vit D 35  Wt Readings from Last 6 Encounters:   10/09/24 196 lb 3.2 oz (89 kg)   08/15/24 199 lb 8 oz (90.5 kg)   10/12/23 203 lb 4 oz (92.2 kg)   12/10/21 175 lb (79.4 kg)   07/22/21 167 lb 12.8 oz (76.1 kg)   07/15/21 169 lb (76.7 kg)     Body mass index is 26.61 kg/m².     Cholesterol, Total (mg/dL)   Date Value   07/15/2021 182   12/30/2020 248 (H)     CHOLESTEROL, TOTAL (mg/dL)   Date Value   10/17/2023 273 (H)     CHOLESTEROL (mg/dL)   Date Value   12/03/2012 270 (H)     HDL Cholesterol (mg/dL)   Date Value   07/15/2021 64 (H)   12/30/2020 51     HDL CHOLESTEROL (mg/dL)   Date Value   10/17/2023 47     HDL CHOL (mg/dL)   Date Value   12/03/2012 51     LDL Cholesterol (mg/dL)   Date Value   07/15/2021 101 (H)   12/30/2020 179 (H)     LDL-CHOLESTEROL (mg/dL (calc))   Date Value   10/17/2023 184 (H)     LDL CHOLESTROL (mg/dL)   Date Value   12/03/2012 199 (H)     AST (U/L)   Date Value   12/30/2020 21   01/11/2013 20   12/03/2012 32     ALT (U/L)   Date Value   12/30/2020 43   01/11/2013 46   12/03/2012 56      Current Outpatient Medications   Medication Sig Dispense Refill    Calcium Carbonate 600 MG Oral Tab Take 1 tablet (600 mg total) by mouth.      Cholecalciferol (VITAMIN D) 50 MCG (2000 UT) Oral Tab Take by mouth.      Cyanocobalamin (VITAMIN B 12 OR) Inject 1,000 mcg into the muscle every 30 (thirty) days.      Desonide 0.05 % External Cream Apply topically. Pt uses couple times a month for psoriasis      EPINEPHrine 0.3 MG/0.3ML Injection Solution Auto-injector INJECT 0.3 MLS INTO THE MUSCLE ONCE FOR 1 DOSE. (Patient not taking: Reported on  10/9/2024)       Allergies[1]     Past Medical History:    Esophageal reflux    Other and unspecified hyperlipidemia    Prostate cancer (HCC)    urology: Dr Lowe/ Nig[place], RT- Dr Colt Murguia, anti-hormone therapy    Psoriasis      Past Surgical History:   Procedure Laterality Date    Colonoscopy      > 10 yrs ago    Colonoscopy  04/23/2021    tubular adenoma x 1    Dexa,bone density,axial skeleton  09/01/2022    Hernia surgery      inguinal    Hernia surgery      umbilical    Repair ing hernia,3+y/o,reducibl        Family History   Problem Relation Age of Onset    Stroke Father     Diabetes Father     Pulmonary Disease Mother         sarcoidosis    Heart Disorder Mother         a-fib    No Known Problems Son     Lipids Son     No Known Problems Sister     Lipids Brother     Other (BPH) Brother     Other (lupus) Paternal Uncle         Social History     Socioeconomic History    Marital status:    Tobacco Use    Smoking status: Former     Passive exposure: Never    Smokeless tobacco: Never    Tobacco comments:     quit when pt was 18   Vaping Use    Vaping status: Never Used   Substance and Sexual Activity    Alcohol use: Not Currently    Drug use: No   Other Topics Concern    Caffeine Concern No    Exercise Yes    Seat Belt Yes    Special Diet No    Stress Concern No    Weight Concern No      Specialists: Pranay Marti PA-ERWIN- oncology, Dr Marina- urology, Dr Stevens- hepatology  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 : +. Children: 2 sons.   Exercise: minimal, active at work.  Diet: watches minimally     REVIEW OF SYSTEMS:   GENERAL: generally feels well, no fatigue, denies excessive daytime drowsiness, good appetite, wt down 7# over past yr  SKIN: denies any unusual skin lesions or rashes  EYES:denies blurred vision or double vision, glasses/ contacts: +, last eye exam:James 11/11/22  ENT: denies nasal congestion, PND or ST, denies snoring and reported periods of apnea, denies hearing deficits,  +tinnitus  LUNGS: denies shortness of breath with exertion, no coughing or wheezing  CARDIOVASCULAR: denies chest pain on exertion, palpations, anginal equivalent symptoms, no claudication , no peripheral edema  GI: denies abdominal pain,denies heartburn, constipation, diarrhea, or change in bowel habits  : denies dysuria, hesitancy,nocturia, decreased urine stream, incontinence, erectile dysfunction, decreased libido   MUSCULOSKELETAL: doing PT w/ [facility] for back pain, doing better, MRI recently w/ post radiation changes  NEURO: denies headaches, tremors, dizziness, numbness and weakness  PSYCHE: denies depression or anxiety, denies any sleep difficulty,                   Have you often been bothered by feeling down, depressed of hopeless? no                  Have you often been bothered by little interest or pleasure in doing things? no  HEMATOLOGIC: denies unexplained bruising or bleeding  ENDOCRINE: denies heat or cold intolerance , no unexplained wt loss or gain  FUNCTIONAL ABILITY: Do you need help with preparing meals? no, Dressing? no , Hygiene? no, Using the bathroom? no, Transportation? no, Shopping? no, Taking medications? no, Banking? no  SAFETY: Does your home have throw rugs? no,  Adequate lighting? yes, Grab bars in bathroom/shower/tub? no, Handrails on stairs? yes, Some alarms? holli    Get Up and Go test > 12 seconds?  no  EXAM:   /82 (BP Location: Left arm, Patient Position: Sitting, Cuff Size: adult)   Pulse 74   Temp 97.4 °F (36.3 °C) (Temporal)   Resp 18   Ht 6' (1.829 m)   Wt 196 lb 3.2 oz (89 kg)   SpO2 99%   BMI 26.61 kg/m²   Body mass index is 26.61 kg/m².   GENERAL: well developed, well nourished,in no apparent distress  SKIN: no rashes,no suspicious lesions, atrophic changes on lower legs with minimal hair, small amount of breast tissue bilaterally, no masses  ENT: EAC:  Clear, TMs: intact, Nose: turbinates normal, septum: midline, discharge: none, Pharynx: class 1 airway,no oral  lesions  EYES:PERRLA, EOMI, normal optic disk,conjunctiva are clear  NECK: supple,no adenopathy,no bruits, no thyromegaly  LUNGS: clear to auscultation, no w/r/r  CARDIO: RRR without murmur, no S3, S4, strong peripheral pulses, no peripheral edema, bilateral femoral artery  bruits, strong pulses  GI: +NBS's,no masses, HSM or tenderness  : Circumcised penis without lesions, atrophic testes descended bilateral without masses, no inguinal hernia defect with Valsalva maneuver, no inguinal lymphadenopathy  Rectal: Good sphincter tone, heme-negative stool in vault, prostate 1+ enlarged without nodularity or induration  BACK:  : FROM, No lateral curves, Flexion:  Fingers to shins   EXTREMITIES: no cyanosis, clubbing or edema, FROM of all joints tested, tight hamstrings  NEURO: A &O X 3,cranial nerves are intact,motor and sensory are grossly intact, DTRs +2/4 UE/LE bilaterally  PSYCH: affect: bright, slightly anxious, speech: clear and coherent, Insight: appropriate  ASSESSMENT AND PLAN:   James Gould  is a 64 year old male   Encounter Diagnoses   Name Primary?    Encounter for annual health examination Yes    History of prostate cancer- 1/27/21     Hypercholesterolemia     Thrombocytopenia (HCC)- PLTS 127K on 8/26/24     Hepatic steatosis- US 9/28/23     Positive JACKY (antinuclear antibody)     Psoriasis     Osteopenia of neck of left femur- DEXA 8/26/24, 4-1.5 left femoral neck, -1.1 spine     Tubular adenoma of colon- 4/23/21, repeat 7 yrs     Hereditary hemochromatosis (HCC)- testing + for 1 gene     Elevated ferritin level     Family history of ovarian cancer     Family history of pancreatic cancer     Family history of breast cancer     Chronic midline low back pain without sciatica     Screening for heart disease     Vitamin D deficiency      No orders of the defined types were placed in this encounter.    Meds & Refills for this Visit:  Requested Prescriptions      No prescriptions requested or ordered in this  encounter     Imaging & Consults:  CT CALCIUM SCORING  No follow-ups on file.  Patient Instructions   1. Encounter for annual health examination  I reviewed age-appropriate preventive health screening exams as well as advanced directives and immunizations.  High-dose flu vaccine provided  I reviewed his extensive lab workup and discussed concerns and abnormalities    2. History of prostate cancer- 1/27/21  Continue serial PSAs and annual BO's    3. Hypercholesterolemia  Reviewed goals for lipids.  I reviewed recent labs.  I discussed indication for statin therapy.  I reviewed Dr. Stevens's note regarding statin therapy  Will check heart scan and use as a decision point for starting treatment  - CT CALCIUM SCORING; Future    4. Thrombocytopenia (HCC)- PLTS 127K on 8/26/24  Monitor clinically    5. Hepatic steatosis- US 9/28/23  Monitor clinically,    6. Positive JACKY (antinuclear antibody)  Reviewed possible contributing factors for positive JACKY.  Strongly suspect psoriasis as autoimmune process resulting in positive result    7. Psoriasis  Monitor clinically, may continue topical treatment as needed    8. Osteopenia of neck of left femur- DEXA 8/26/24, 4-1.5 left femoral neck, -1.1 spine  Discussed the importance of increased daily weightbearing activity and vitamin D    9. Tubular adenoma of colon- 4/23/21, repeat 7 yrs  Repeat colonoscopy 2028    10. Hereditary hemochromatosis (HCC)- testing + for 1 gene  Monitor clinically    11. Elevated ferritin level  Continue serial ferritin monitoring    12. Family history of ovarian cancer  Noted    13. Family history of pancreatic cancer  Noted    14. Family history of breast cancer  Noted    15. Chronic midline low back pain without sciatica  Continue physical therapy and home exercise program    16. Screening for heart disease  Will check heart scan  - CT CALCIUM SCORING; Future    17. Vitamin D deficiency  Increase vitamin D to 5000 IU daily    Kelvin Kauffman DO,  FAAFP           [1]   Allergies  Allergen Reactions    [place] HIVES    Hazelnuts      Legs swell    Zometa [Zoledronic Acid] OTHER (SEE COMMENTS)     Swelling in eye, swollen

## 2024-10-09 NOTE — PATIENT INSTRUCTIONS
1. Encounter for annual health examination  I reviewed age-appropriate preventive health screening exams as well as advanced directives and immunizations.  High-dose flu vaccine provided  I reviewed his extensive lab workup and discussed concerns and abnormalities    2. History of prostate cancer- 1/27/21  Continue serial PSAs and annual BO's    3. Hypercholesterolemia  Reviewed goals for lipids.  I reviewed recent labs.  I discussed indication for statin therapy.  I reviewed Dr. Stevens's note regarding statin therapy  Will check heart scan and use as a decision point for starting treatment  - CT CALCIUM SCORING; Future    4. Thrombocytopenia (HCC)- PLTS 127K on 8/26/24  Monitor clinically    5. Hepatic steatosis- US 9/28/23  Monitor clinically,    6. Positive JACKY (antinuclear antibody)  Reviewed possible contributing factors for positive JACKY.  Strongly suspect psoriasis as autoimmune process resulting in positive result    7. Psoriasis  Monitor clinically, may continue topical treatment as needed    8. Osteopenia of neck of left femur- DEXA 8/26/24, 4-1.5 left femoral neck, -1.1 spine  Discussed the importance of increased daily weightbearing activity and vitamin D    9. Tubular adenoma of colon- 4/23/21, repeat 7 yrs  Repeat colonoscopy 2028    10. Hereditary hemochromatosis (HCC)- testing + for 1 gene  Monitor clinically    11. Elevated ferritin level  Continue serial ferritin monitoring    12. Family history of ovarian cancer  Noted    13. Family history of pancreatic cancer  Noted    14. Family history of breast cancer  Noted    15. Chronic midline low back pain without sciatica  Continue physical therapy and home exercise program    16. Screening for heart disease  Will check heart scan  - CT CALCIUM SCORING; Future    17. Vitamin D deficiency  Increase vitamin D to 5000 IU daily

## 2024-10-28 ENCOUNTER — HOSPITAL ENCOUNTER (OUTPATIENT)
Dept: ULTRASOUND IMAGING | Facility: HOSPITAL | Age: 64
Discharge: HOME | End: 2024-10-28
Attending: FAMILY MEDICINE

## 2024-10-28 DIAGNOSIS — Z13.6 SCREENING FOR HEART DISEASE: ICD-10-CM

## 2024-11-06 ENCOUNTER — ORDER TRANSCRIPTION (OUTPATIENT)
Dept: ADMINISTRATIVE | Facility: HOSPITAL | Age: 64
End: 2024-11-06

## 2024-11-06 DIAGNOSIS — Z13.6 SCREENING FOR CARDIOVASCULAR CONDITION: Primary | ICD-10-CM

## 2024-11-09 ENCOUNTER — HOSPITAL ENCOUNTER (OUTPATIENT)
Dept: CT IMAGING | Facility: HOSPITAL | Age: 64
Discharge: HOME | End: 2024-11-09
Attending: FAMILY MEDICINE

## 2024-11-09 DIAGNOSIS — Z13.6 SCREENING FOR CARDIOVASCULAR CONDITION: ICD-10-CM

## 2024-11-09 NOTE — PROGRESS NOTES
Date of Service 2024    LAURA DAVID  Date of Birth 1960    Patient Age: 64 year old    PCP: Kelvin Kauffman,   [address] [facility] [address]    Heart Scan Consult  Preliminary Heart Scan Score: 47.7    Previous Screening  Heart Scan Completed Previously: No        Peripheral Vascular Scan Completed Previously: Yes  Year of last PV screenin  Score of last PV screening: INTERNAL CAROTID ARTERIES:       Right ICA = 2 Left ICA = 2       1= Normal (No plaque, ICA PSV <150 cm/s)   2= Mild   (Plaque present, ICA PSV <150 cm/s)   3= Moderate   (ICA PSV between 150-225 cm/s)   4= Severe   (ICA PSV >225)         ABDOMINAL AORTA: 1      1= Less than 3 cm- Normal   2= More than 3 cm-Abnormal   3= Not visible      ANKLE BRACHIAL INDICES:       Right =1 Left =1      1= More than 0.9-Normal   2= Less than 0.9-Abnormal   3= Calcified vessels/not compressible    Risk Factors  Personal Risk Factors  Non-alterable Risk Factors: Family History;Gender;Age (Reports, Father had a mild stroke in his 70s)  Alterable Risk Factors: Abnormal Cholesterol      Body Mass Index  There is no height or weight on file to calculate BMI.    Blood Pressure    (Normal =< 120/80,  Elevated = 120-129/ >80,  High Stage1 130-139/80-89 , Stage2 >140/>90)    Lipid Profile  2024  Total Cholesterol - 228  HDL - 54  LDL - 150  Triglycerides - 120    Cholesterol Goals  Value   Total  =< 200   HDL  = > 45 Men = > 55 Women   LDL   =< 100   Triglycerides  =< 150       Glucose and Hemoglobin A1C    2024  Glucose - 100    (Normal Fasting Glucose < 100mg/dl )    Nurse Review  Risk factor information and results reviewed with Nurse: Yes    Recommended Follow Up:  Consult your physician regarding:: Final Heart Scan Report;Discuss Potential for Score Variance;Discuss potential for Incidental Finding      Recommendations for Change:  Nutrition Changes: Low Saturated Fat;Low Fat Dairy;Low Salt Eating;Increase  Fiber    Cholesterol Modification (goal of therapy depends upon your risk): Decrease LDL (Lousy/Bad) Ideal <100;Decrease Total Normal <200    Exercise: No Change Needed                   Repeat Heart Scan: Discuss with your Physician;3 Years if Calcium Score is > 0.0              [place] Recommended Resources:  Recommended Resources: Upcoming Classes, Medical Services and Health Library [email or web address]     Other Resources:: Handouts given - Controlling Risk Factors, Cholesterol, and Diabetes ([email or web address] guide)      Sharif BABB RN        Please Contact the Nurse Heart Line with any Questions or Concerns [phone].

## 2024-12-10 ENCOUNTER — TELEPHONE (OUTPATIENT)
Dept: FAMILY MEDICINE CLINIC | Facility: CLINIC | Age: 64
End: 2024-12-10

## 2024-12-10 DIAGNOSIS — E78.00 HYPERCHOLESTEROLEMIA: Primary | ICD-10-CM

## 2024-12-10 RX ORDER — ROSUVASTATIN CALCIUM 5 MG/1
5 TABLET, COATED ORAL NIGHTLY
Qty: 90 TABLET | Refills: 0 | Status: SHIPPED | OUTPATIENT
Start: 2024-12-10

## 2024-12-10 NOTE — TELEPHONE ENCOUNTER
Pt had a heart CT done one 11/9/24.  Dr. Kauffman recommended rosuvastatin 10mg nightly and recheck lipids 3 months later.    At that time, pt wanted to do some research and get back to us.     Pt calls today. Said he is willing to take rosuvastatin, BUT ONLY 5MG DOSE.    Okay to send a script for this dose?    *pt called back and requests that script go to [facility] [address]

## 2024-12-10 NOTE — TELEPHONE ENCOUNTER
A couple of weeks ago, dr asked patient if he would try a statin.  He said he will try it but he wanted to try 5 mg not the 10 mg.  Pharmacy:  [facility] [address] [place]

## 2025-01-14 RX ORDER — DESONIDE 0.5 MG/G
CREAM TOPICAL
Qty: 15 G | Refills: 0 | Status: SHIPPED | OUTPATIENT
Start: 2025-01-14

## 2025-01-14 NOTE — TELEPHONE ENCOUNTER
Desonide cream shows \"patient reported\"    Per patient - has been on this since youth. Prescribed by different provider years ago, he states \"tube lasts a long while\"    Medication previously discussed with Dr. Kauffman - would like refill sent to [facility] in [place].

## 2025-01-16 ENCOUNTER — OFFICE VISIT (OUTPATIENT)
Dept: FAMILY MEDICINE CLINIC | Facility: CLINIC | Age: 65
End: 2025-01-16
Payer: MEDICARE

## 2025-01-16 VITALS
OXYGEN SATURATION: 98 % | HEART RATE: 78 BPM | TEMPERATURE: 98 F | DIASTOLIC BLOOD PRESSURE: 70 MMHG | RESPIRATION RATE: 18 BRPM | HEIGHT: 72 IN | SYSTOLIC BLOOD PRESSURE: 120 MMHG | BODY MASS INDEX: 26.55 KG/M2 | WEIGHT: 196 LBS

## 2025-01-16 DIAGNOSIS — Z85.46 HISTORY OF PROSTATE CANCER: ICD-10-CM

## 2025-01-16 DIAGNOSIS — J32.9 SINOBRONCHITIS: Primary | ICD-10-CM

## 2025-01-16 DIAGNOSIS — J40 SINOBRONCHITIS: Primary | ICD-10-CM

## 2025-01-16 PROCEDURE — 99214 OFFICE O/P EST MOD 30 MIN: CPT | Performed by: FAMILY MEDICINE

## 2025-01-16 RX ORDER — AZITHROMYCIN 250 MG/1
TABLET, FILM COATED ORAL
Qty: 6 TABLET | Refills: 0 | Status: SHIPPED | OUTPATIENT
Start: 2025-01-16 | End: 2025-01-21

## 2025-01-16 RX ORDER — PREDNISONE 20 MG/1
40 TABLET ORAL DAILY
Qty: 10 TABLET | Refills: 0 | Status: SHIPPED | OUTPATIENT
Start: 2025-01-16 | End: 2025-01-21

## 2025-01-16 NOTE — PROGRESS NOTES
Subjective:   Patient ID: James Gould Jr. is a 64 year old male.    HPI  X 1 wk  + cough / nausea  W/o fever  Spasms of cough.  Denies shortness of breath.  Denies vomiting/diarrhea/constipation.  Without wheezing noted.  Over-the-counter medications without relief.  COVID-negative at home.  History/Other:   Review of Systems  Current Outpatient Medications   Medication Sig Dispense Refill    predniSONE 20 MG Oral Tab Take 2 tablets (40 mg total) by mouth daily for 5 days. 10 tablet 0    azithromycin (ZITHROMAX Z-CLEMENT) 250 MG Oral Tab Take 2 tablets (500 mg total) by mouth daily for 1 day, THEN 1 tablet (250 mg total) daily for 4 days. 6 tablet 0    desonide 0.05 % External Cream Apply thinly to affected area twice daily as needed 15 g 0    rosuvastatin 5 MG Oral Tab Take 1 tablet (5 mg total) by mouth nightly. 90 tablet 0    Calcium Carbonate 600 MG Oral Tab Take 1 tablet (600 mg total) by mouth.      Cholecalciferol (VITAMIN D) 50 MCG (2000 UT) Oral Tab Take by mouth.      Cyanocobalamin (VITAMIN B 12 OR) Inject 1,000 mcg into the muscle every 30 (thirty) days.      EPINEPHrine 0.3 MG/0.3ML Injection Solution Auto-injector INJECT 0.3 MLS INTO THE MUSCLE ONCE FOR 1 DOSE. (Patient not taking: Reported on 1/16/2025)       Allergies:Allergies[1]    Objective:   Physical Exam  Vitals reviewed.   Constitutional:       General: He is not in acute distress.     Appearance: Normal appearance. He is not toxic-appearing.   HENT:      Right Ear: Tympanic membrane normal.      Left Ear: Tympanic membrane normal.      Nose: Congestion and rhinorrhea present.      Mouth/Throat:      Mouth: Mucous membranes are moist.      Pharynx: Oropharynx is clear.   Eyes:      Conjunctiva/sclera: Conjunctivae normal.   Cardiovascular:      Rate and Rhythm: Normal rate and regular rhythm.      Pulses: Normal pulses.      Heart sounds: Normal heart sounds.   Pulmonary:      Effort: Pulmonary effort is normal.      Comments: Coarse breath  sounds without wheeze.  Musculoskeletal:      Cervical back: Normal range of motion and neck supple.      Right lower leg: No edema.      Left lower leg: No edema.   Lymphadenopathy:      Cervical: No cervical adenopathy.   Neurological:      Mental Status: He is alert.       /70   Pulse 78   Temp 98.2 °F (36.8 °C) (Temporal)   Resp 18   Ht 6' (1.829 m)   Wt 196 lb (88.9 kg)   SpO2 98%   BMI 26.58 kg/m²     Assessment & Plan:   1. Sinobronchitis    2. History of prostate cancer- 1/27/21        No orders of the defined types were placed in this encounter.      Meds This Visit:  Requested Prescriptions     Signed Prescriptions Disp Refills    predniSONE 20 MG Oral Tab 10 tablet 0     Sig: Take 2 tablets (40 mg total) by mouth daily for 5 days.    azithromycin (ZITHROMAX Z-CLEMENT) 250 MG Oral Tab 6 tablet 0     Sig: Take 2 tablets (500 mg total) by mouth daily for 1 day, THEN 1 tablet (250 mg total) daily for 4 days.       Imaging & Referrals:  None       [1]   Allergies  Allergen Reactions    [place] HIVES    Hazelnuts      Legs swell    Zometa [Zoledronic Acid] OTHER (SEE COMMENTS)     Swelling in eye, swollen

## 2025-01-16 NOTE — PATIENT INSTRUCTIONS
Medications as directed.  Humidifier, Vicks, honey and lemon.  Sudafed x 3 to 5 days.  Call with questions or problems.

## 2025-03-21 ENCOUNTER — LABORATORY ENCOUNTER (OUTPATIENT)
Dept: LAB | Age: 65
End: 2025-03-21
Attending: FAMILY MEDICINE
Payer: MEDICARE

## 2025-03-21 DIAGNOSIS — E78.00 HYPERCHOLESTEROLEMIA: ICD-10-CM

## 2025-03-21 LAB
CHOLEST SERPL-MCNC: 158 MG/DL (ref ?–200)
FASTING PATIENT LIPID ANSWER: YES
HDLC SERPL-MCNC: 47 MG/DL (ref 40–59)
LDLC SERPL CALC-MCNC: 96 MG/DL (ref ?–100)
NONHDLC SERPL-MCNC: 111 MG/DL (ref ?–130)
TRIGL SERPL-MCNC: 78 MG/DL (ref 30–149)
VLDLC SERPL CALC-MCNC: 13 MG/DL (ref 0–30)

## 2025-03-21 PROCEDURE — 80061 LIPID PANEL: CPT

## 2025-03-21 PROCEDURE — 36415 COLL VENOUS BLD VENIPUNCTURE: CPT

## 2025-03-22 NOTE — TELEPHONE ENCOUNTER
Requested Prescriptions     Pending Prescriptions Disp Refills    ROSUVASTATIN 5 MG Oral Tab [Pharmacy Med Name: Rosuvastatin Calcium 5 MG Oral Tablet] 90 tablet 0     Sig: Take 1 tablet by mouth nightly     Last refill 12/10/24 #90  LOV 10/9/24  No future appointments.  Last labs 3/21/25  Cholesterol Medication Protocol [id number]/22/2025 09:40 AM   Protocol Details ALT < 80    ALT resulted within past year    Lipid panel within past 12 months    In person appointment or virtual visit in the past 12 mos or appointment in next 3 mos    Medication is active on med list

## 2025-03-23 RX ORDER — ROSUVASTATIN CALCIUM 5 MG/1
5 TABLET, COATED ORAL NIGHTLY
Qty: 90 TABLET | Refills: 3 | Status: SHIPPED | OUTPATIENT
Start: 2025-03-23

## 2025-03-29 ENCOUNTER — OFFICE VISIT (OUTPATIENT)
Dept: FAMILY MEDICINE CLINIC | Facility: CLINIC | Age: 65
End: 2025-03-29
Payer: MEDICARE

## 2025-03-29 VITALS
HEIGHT: 72 IN | BODY MASS INDEX: 26.28 KG/M2 | TEMPERATURE: 98 F | WEIGHT: 194 LBS | DIASTOLIC BLOOD PRESSURE: 82 MMHG | OXYGEN SATURATION: 97 % | SYSTOLIC BLOOD PRESSURE: 128 MMHG | HEART RATE: 84 BPM

## 2025-03-29 DIAGNOSIS — H60.502 ACUTE OTITIS EXTERNA OF LEFT EAR, UNSPECIFIED TYPE: Primary | ICD-10-CM

## 2025-03-29 PROCEDURE — 99213 OFFICE O/P EST LOW 20 MIN: CPT

## 2025-03-29 RX ORDER — CIPROFLOXACIN AND DEXAMETHASONE 3; 1 MG/ML; MG/ML
4 SUSPENSION/ DROPS AURICULAR (OTIC) 2 TIMES DAILY
Qty: 7.5 ML | Refills: 0 | Status: SHIPPED | OUTPATIENT
Start: 2025-03-29 | End: 2025-04-05

## 2025-03-29 NOTE — PROGRESS NOTES
Subjective:   Patient ID: James Gould Jr. is a 64 year old male.    Patient presents to clinic with 8 days of left ear pain. Reports that he has a history of psoriasis in ear and was applying desonide to canal. States that tenderness has worsened and last night used a q-tip as it felt like there was water in his ear and had some blood on cotton.     Ear Pain   There is pain in the left ear. This is a new problem. The current episode started 1 to 4 weeks ago. The problem has been gradually worsening. There has been no fever. Pertinent negatives include no ear discharge.       History/Other:   Review of Systems   HENT:  Positive for ear pain. Negative for ear discharge.    All other systems reviewed and are negative.    Current Outpatient Medications   Medication Sig Dispense Refill    ciprofloxacin-dexamethasone 0.3-0.1 % Otic Suspension Place 4 drops into the left ear 2 (two) times daily for 7 days. 7.5 mL 0    rosuvastatin 5 MG Oral Tab Take 1 tablet (5 mg total) by mouth nightly. 90 tablet 3    desonide 0.05 % External Cream Apply thinly to affected area twice daily as needed 15 g 0    Calcium Carbonate 600 MG Oral Tab Take 1 tablet (600 mg total) by mouth.      Cholecalciferol (VITAMIN D) 50 MCG (2000 UT) Oral Tab Take by mouth.      Cyanocobalamin (VITAMIN B 12 OR) Inject 1,000 mcg into the muscle every 30 (thirty) days.      EPINEPHrine 0.3 MG/0.3ML Injection Solution Auto-injector INJECT 0.3 MLS INTO THE MUSCLE ONCE FOR 1 DOSE. (Patient not taking: Reported on 1/16/2025)       Allergies:Allergies[1]    Objective:   Physical Exam  Vitals reviewed.   Constitutional:       General: He is not in acute distress.     Appearance: Normal appearance. He is not ill-appearing.   HENT:      Head: Normocephalic and atraumatic.      Right Ear: Tympanic membrane, ear canal and external ear normal.      Left Ear: Tympanic membrane and external ear normal. Swelling present.  No middle ear effusion. There is no impacted  cerumen. No mastoid tenderness. Tympanic membrane is not erythematous, retracted or bulging.      Ears:      Comments: Left EAC with mild erythema and edema. Small amount of exudate.   Cardiovascular:      Rate and Rhythm: Normal rate and regular rhythm.      Pulses: Normal pulses.      Heart sounds: Normal heart sounds.   Pulmonary:      Effort: Pulmonary effort is normal.      Breath sounds: Normal breath sounds.   Musculoskeletal:         General: Normal range of motion.      Cervical back: Normal range of motion and neck supple.   Lymphadenopathy:      Cervical: No cervical adenopathy.   Skin:     General: Skin is warm and dry.      Capillary Refill: Capillary refill takes less than 2 seconds.   Neurological:      General: No focal deficit present.      Mental Status: He is alert and oriented to person, place, and time.   Psychiatric:         Mood and Affect: Mood normal.         Behavior: Behavior normal.         Assessment & Plan:   1. Acute otitis externa of left ear, unspecified type        Discussion about supportive treatment. If symptoms continue or worsen follow up with PCP.      Meds This Visit:  Requested Prescriptions     Signed Prescriptions Disp Refills    ciprofloxacin-dexamethasone 0.3-0.1 % Otic Suspension 7.5 mL 0     Sig: Place 4 drops into the left ear 2 (two) times daily for 7 days.       Imaging & Referrals:  None         [1]   Allergies  Allergen Reactions    [place] HIVES    Hazelnuts      Legs swell    Zometa [Zoledronic Acid] OTHER (SEE COMMENTS)     Swelling in eye, swollen

## 2025-06-26 ENCOUNTER — OFFICE VISIT (OUTPATIENT)
Dept: FAMILY MEDICINE CLINIC | Facility: CLINIC | Age: 65
End: 2025-06-26
Payer: MEDICARE

## 2025-06-26 ENCOUNTER — LABORATORY ENCOUNTER (OUTPATIENT)
Dept: LAB | Age: 65
End: 2025-06-26
Payer: MEDICARE

## 2025-06-26 ENCOUNTER — TELEPHONE (OUTPATIENT)
Dept: FAMILY MEDICINE CLINIC | Facility: CLINIC | Age: 65
End: 2025-06-26

## 2025-06-26 VITALS
DIASTOLIC BLOOD PRESSURE: 90 MMHG | TEMPERATURE: 98 F | OXYGEN SATURATION: 98 % | SYSTOLIC BLOOD PRESSURE: 138 MMHG | BODY MASS INDEX: 27.67 KG/M2 | WEIGHT: 204.25 LBS | HEART RATE: 76 BPM | HEIGHT: 72 IN

## 2025-06-26 DIAGNOSIS — I49.9 IRREGULAR HEART RATE: Primary | ICD-10-CM

## 2025-06-26 DIAGNOSIS — Z13.0 SCREENING FOR DEFICIENCY ANEMIA: ICD-10-CM

## 2025-06-26 DIAGNOSIS — Z13.29 THYROID DISORDER SCREEN: ICD-10-CM

## 2025-06-26 DIAGNOSIS — R00.2 PALPITATIONS: ICD-10-CM

## 2025-06-26 LAB
ALBUMIN SERPL-MCNC: 4.8 G/DL (ref 3.2–4.8)
ALBUMIN/GLOB SERPL: 1.8 {RATIO} (ref 1–2)
ALP LIVER SERPL-CCNC: 53 U/L (ref 45–117)
ALT SERPL-CCNC: 48 U/L (ref 10–49)
ANION GAP SERPL CALC-SCNC: 10 MMOL/L (ref 0–18)
AST SERPL-CCNC: 32 U/L (ref ?–34)
BASOPHILS # BLD AUTO: 0.03 X10(3) UL (ref 0–0.2)
BASOPHILS NFR BLD AUTO: 0.7 %
BILIRUB SERPL-MCNC: 0.5 MG/DL (ref 0.2–1.1)
BUN BLD-MCNC: 21 MG/DL (ref 9–23)
CALCIUM BLD-MCNC: 9.7 MG/DL (ref 8.7–10.6)
CHLORIDE SERPL-SCNC: 104 MMOL/L (ref 98–112)
CO2 SERPL-SCNC: 28 MMOL/L (ref 21–32)
CREAT BLD-MCNC: 1.16 MG/DL (ref 0.7–1.3)
EGFRCR SERPLBLD CKD-EPI 2021: 69 ML/MIN/1.73M2 (ref 60–?)
EOSINOPHIL # BLD AUTO: 0.11 X10(3) UL (ref 0–0.7)
EOSINOPHIL NFR BLD AUTO: 2.5 %
ERYTHROCYTE [DISTWIDTH] IN BLOOD BY AUTOMATED COUNT: 12.7 %
FASTING STATUS PATIENT QL REPORTED: NO
GLOBULIN PLAS-MCNC: 2.6 G/DL (ref 2–3.5)
GLUCOSE BLD-MCNC: 80 MG/DL (ref 70–99)
HCT VFR BLD AUTO: 42.3 % (ref 39–53)
HGB BLD-MCNC: 14.4 G/DL (ref 13–17.5)
IMM GRANULOCYTES # BLD AUTO: 0.01 X10(3) UL (ref 0–1)
IMM GRANULOCYTES NFR BLD: 0.2 %
LYMPHOCYTES # BLD AUTO: 0.78 X10(3) UL (ref 1–4)
LYMPHOCYTES NFR BLD AUTO: 17.5 %
MAGNESIUM SERPL-MCNC: 2 MG/DL (ref 1.6–2.6)
MCH RBC QN AUTO: 33.3 PG (ref 26–34)
MCHC RBC AUTO-ENTMCNC: 34 G/DL (ref 31–37)
MCV RBC AUTO: 97.9 FL (ref 80–100)
MONOCYTES # BLD AUTO: 0.47 X10(3) UL (ref 0.1–1)
MONOCYTES NFR BLD AUTO: 10.5 %
NEUTROPHILS # BLD AUTO: 3.06 X10 (3) UL (ref 1.5–7.7)
NEUTROPHILS # BLD AUTO: 3.06 X10(3) UL (ref 1.5–7.7)
NEUTROPHILS NFR BLD AUTO: 68.6 %
OSMOLALITY SERPL CALC.SUM OF ELEC: 296 MOSM/KG (ref 275–295)
PLATELET # BLD AUTO: 144 10(3)UL (ref 150–450)
POTASSIUM SERPL-SCNC: 4.1 MMOL/L (ref 3.5–5.1)
PROT SERPL-MCNC: 7.4 G/DL (ref 5.7–8.2)
RBC # BLD AUTO: 4.32 X10(6)UL (ref 3.8–5.8)
SODIUM SERPL-SCNC: 142 MMOL/L (ref 136–145)
TSI SER-ACNC: 1.13 UIU/ML (ref 0.55–4.78)
WBC # BLD AUTO: 4.5 X10(3) UL (ref 4–11)

## 2025-06-26 PROCEDURE — 93000 ELECTROCARDIOGRAM COMPLETE: CPT

## 2025-06-26 PROCEDURE — 99214 OFFICE O/P EST MOD 30 MIN: CPT

## 2025-06-26 NOTE — TELEPHONE ENCOUNTER
Patient calling stating he has had a irregular heart beat since being out in the heat. I did schedule with Swati this afternoon.

## 2025-06-26 NOTE — TELEPHONE ENCOUNTER
Spoke with patient who states he was working outside in the heat on Saturday and then on Sunday he started feeling like he had an irregular heart beat. He denies any chest pain or shortness of breath. He has stopped caffeine and started drinking different kinds of electrolyte drinks. He states he can feel the irregular beat with his pulse as well. Advised his appointment is in a few minutes. He can come to the appointment where we will probably do an EKG, but if anything significant, we may send him to the ER. He verbalized understanding. Will forward to Swati as an FYI.    Future Appointments   Date Time Provider Department Center   6/26/2025 12:40 PM Swati Guallpa APRN [facility]

## 2025-06-26 NOTE — PROGRESS NOTES
Subjective:   James Gould Jr. is a 65 year old male who presents for Irregular Heart Beat (Started Sunday, patient reports having palpitations in the past... patient drinking electrolytes from possible dehydration )     History/Other:   History of Present Illness  Sherwin Gould Jr. is a 65 year old male who presents with heart palpitations.    He has been experiencing heart palpitations since Sunday (4 days ago), following a day of being out in the heat on Saturday. The palpitations are described as a sensation that 'takes his breath away' and occur with an irregular rhythm, characterized by a pattern of 'boom, boom, stop, boom, boom, boom.'    He has a history of experiencing palpitations throughout his life, but they were typically brief and resolved quickly. Recently, the palpitations have been accompanied by a feeling of shortness of breath. In response to these symptoms, he has stopped drinking coffee, suspecting that caffeine might be contributing to the issue. He also considered that his well water, which is filtered through a reverse osmosis system, might be lacking in electrolytes. To address this, he has started consuming electrolyte drinks, which seemed to help slightly, but he continues to experience palpitations.    He mentions a family history of heart issues, noting that his mother had an ablation procedure and sarcoidosis, which affected her heart. His sister-in-law also had a similar procedure.    He has not had recent blood work since October of the previous year, when he underwent a heart scan and was started on rosuvastatin. No chest pain, dizziness, or headaches associated with the palpitations.     Chief Complaint Reviewed and Verified  Nursing Notes Reviewed and   Verified  Tobacco Reviewed  Allergies Reviewed  Medications Reviewed    Problem List Reviewed  Medical History Reviewed  Surgical History   Reviewed  Family History Reviewed         Tobacco:  He smoked tobacco in the past but  quit greater than 12 months ago.  Tobacco Use[1]     Current Medications[2]    PHQ-2 SCORE: 0  , done 6/26/2025        Review of Systems:  Pertinent items are noted in HPI.      Objective:   /90   Pulse 76   Temp 98.3 °F (36.8 °C) (Temporal)   Ht 6' (1.829 m)   Wt 204 lb 4 oz (92.6 kg)   SpO2 98%   BMI 27.70 kg/m²  Estimated body mass index is 27.7 kg/m² as calculated from the following:    Height as of this encounter: 6' (1.829 m).    Weight as of this encounter: 204 lb 4 oz (92.6 kg).    Physical Exam  Constitutional:       Appearance: Normal appearance.   HENT:      Head: Normocephalic.   Cardiovascular:      Rate and Rhythm: Normal rate and regular rhythm.      Pulses: Normal pulses.      Heart sounds: Normal heart sounds.   Pulmonary:      Effort: Pulmonary effort is normal.      Breath sounds: Normal breath sounds.   Musculoskeletal:      Right lower leg: No edema.      Left lower leg: No edema.   Skin:     General: Skin is warm and dry.      Capillary Refill: Capillary refill takes less than 2 seconds.   Neurological:      General: No focal deficit present.      Mental Status: He is alert.   Psychiatric:         Mood and Affect: Mood is anxious.         Assessment & Plan:   1. Irregular heart rate (Primary)  -     EKG with interpretation and Report -IN OFFICE [[id number]]  -     CARD MONITOR HOLTER 48 HOUR (CPT=93225); Future; Expected date: 06/26/2025  2. Palpitations  -     CARD MONITOR HOLTER 48 HOUR (CPT=93225); Future; Expected date: 06/26/2025  -     Comp Metabolic Panel (14)  -     Magnesium  -     TSH W Reflex To Free T4  3. Screening for deficiency anemia  -     CBC With Differential With Platelet  4. Thyroid disorder screen  -     TSH W Reflex To Free T4    Assessment & Plan  Heart palpitations  Intermittent palpitations post-exertion, normal EKG (reviewed with Dr. Lee), possible electrolyte imbalance or caffeine-related. Family cardiac history noted.  - Order 48-hour Holter monitor.  -  Advise emergency care for chest pain, shortness of breath, dizziness, or headaches.  - Order blood work for electrolytes.  - Provide after visit summary with contact information for cardiac monitor setup.      JEROME Rice, 6/26/2025             [1]   Social History  Tobacco Use   Smoking Status Former    Passive exposure: Never   Smokeless Tobacco Never   Tobacco Comments    quit when pt was 18   [2]   Current Outpatient Medications   Medication Sig Dispense Refill    rosuvastatin 5 MG Oral Tab Take 1 tablet (5 mg total) by mouth nightly. 90 tablet 3    desonide 0.05 % External Cream Apply thinly to affected area twice daily as needed 15 g 0    Calcium Carbonate 600 MG Oral Tab Take 1 tablet (600 mg total) by mouth.      Cholecalciferol (VITAMIN D) 50 MCG (2000 UT) Oral Tab Take by mouth.      Cyanocobalamin (VITAMIN B 12 OR) Inject 1,000 mcg into the muscle every 30 (thirty) days.      EPINEPHrine 0.3 MG/0.3ML Injection Solution Auto-injector INJECT 0.3 MLS INTO THE MUSCLE ONCE FOR 1 DOSE.

## 2025-06-27 LAB
ATRIAL RATE: 74 BPM
P AXIS: 54 DEGREES
P-R INTERVAL: 158 MS
Q-T INTERVAL: 394 MS
QRS DURATION: 82 MS
QTC CALCULATION (BEZET): 437 MS
R AXIS: 56 DEGREES
T AXIS: 75 DEGREES
VENTRICULAR RATE: 74 BPM

## 2025-08-05 ENCOUNTER — TELEPHONE (OUTPATIENT)
Dept: FAMILY MEDICINE CLINIC | Facility: CLINIC | Age: 65
End: 2025-08-05

## (undated) NOTE — LETTER
05/06/21        [address]  [place] [address]      Dear Mee Barakat,    [facility] records indicate that you have outstanding lab work and or testing that was ordered for you and has not yet been completed:  Orders Placed This Encounter